# Patient Record
Sex: MALE | Race: WHITE | NOT HISPANIC OR LATINO | Employment: OTHER | URBAN - METROPOLITAN AREA
[De-identification: names, ages, dates, MRNs, and addresses within clinical notes are randomized per-mention and may not be internally consistent; named-entity substitution may affect disease eponyms.]

---

## 2017-03-04 ENCOUNTER — HOSPITAL ENCOUNTER (INPATIENT)
Facility: HOSPITAL | Age: 62
LOS: 1 days | Discharge: HOME | DRG: 303 | End: 2017-03-06
Attending: EMERGENCY MEDICINE | Admitting: HOSPITALIST
Payer: MEDICARE

## 2017-03-04 DIAGNOSIS — R29.810 FACIAL DROOP: ICD-10-CM

## 2017-03-04 DIAGNOSIS — I24.0 ACUTE THROMBUS OF LEFT VENTRICLE: Primary | ICD-10-CM

## 2017-03-04 DIAGNOSIS — G45.9 TRANSIENT CEREBRAL ISCHEMIA, UNSPECIFIED TYPE: ICD-10-CM

## 2017-03-04 DIAGNOSIS — I51.7 CARDIOMEGALY: ICD-10-CM

## 2017-03-04 LAB
ANION GAP SERPL CALC-SCNC: 14 MMOL/L
BASOPHILS # BLD AUTO: 0.1 K/UL
BASOPHILS NFR BLD: 0.4 %
BUN SERPL-MCNC: 29 MG/DL
CALCIUM SERPL-MCNC: 9.7 MG/DL
CHLORIDE SERPL-SCNC: 104 MMOL/L
CO2 SERPL-SCNC: 20 MMOL/L
CREAT SERPL-MCNC: 1.7 MG/DL
DIFFERENTIAL METHOD: ABNORMAL
EOSINOPHIL # BLD AUTO: 0.3 K/UL
EOSINOPHIL NFR BLD: 2.4 %
ERYTHROCYTE [DISTWIDTH] IN BLOOD BY AUTOMATED COUNT: 14.3 %
EST. GFR  (AFRICAN AMERICAN): 49 ML/MIN/1.73 M^2
EST. GFR  (NON AFRICAN AMERICAN): 42 ML/MIN/1.73 M^2
GLUCOSE SERPL-MCNC: 104 MG/DL
HCT VFR BLD AUTO: 53.2 %
HGB BLD-MCNC: 17.4 G/DL
LYMPHOCYTES # BLD AUTO: 2.9 K/UL
LYMPHOCYTES NFR BLD: 24.5 %
MCH RBC QN AUTO: 29.3 PG
MCHC RBC AUTO-ENTMCNC: 32.7 %
MCV RBC AUTO: 90 FL
MONOCYTES # BLD AUTO: 1 K/UL
MONOCYTES NFR BLD: 8.7 %
NEUTROPHILS # BLD AUTO: 7.5 K/UL
NEUTROPHILS NFR BLD: 64 %
PLATELET # BLD AUTO: 188 K/UL
PMV BLD AUTO: 8.9 FL
POTASSIUM SERPL-SCNC: 4.1 MMOL/L
RBC # BLD AUTO: 5.93 M/UL
SODIUM SERPL-SCNC: 138 MMOL/L
WBC # BLD AUTO: 11.8 K/UL

## 2017-03-04 PROCEDURE — 80048 BASIC METABOLIC PNL TOTAL CA: CPT

## 2017-03-04 PROCEDURE — G0378 HOSPITAL OBSERVATION PER HR: HCPCS

## 2017-03-04 PROCEDURE — 36415 COLL VENOUS BLD VENIPUNCTURE: CPT

## 2017-03-04 PROCEDURE — 25000003 PHARM REV CODE 250: Performed by: EMERGENCY MEDICINE

## 2017-03-04 PROCEDURE — 85025 COMPLETE CBC W/AUTO DIFF WBC: CPT

## 2017-03-04 PROCEDURE — 99285 EMERGENCY DEPT VISIT HI MDM: CPT

## 2017-03-04 PROCEDURE — 93005 ELECTROCARDIOGRAM TRACING: CPT

## 2017-03-04 RX ORDER — ENOXAPARIN SODIUM 100 MG/ML
40 INJECTION SUBCUTANEOUS EVERY 24 HOURS
Status: DISCONTINUED | OUTPATIENT
Start: 2017-03-05 | End: 2017-03-05

## 2017-03-04 RX ORDER — IBUPROFEN 200 MG
16 TABLET ORAL
Status: DISCONTINUED | OUTPATIENT
Start: 2017-03-04 | End: 2017-03-06 | Stop reason: HOSPADM

## 2017-03-04 RX ORDER — ATORVASTATIN CALCIUM 20 MG/1
20 TABLET, FILM COATED ORAL DAILY
Status: DISCONTINUED | OUTPATIENT
Start: 2017-03-05 | End: 2017-03-06

## 2017-03-04 RX ORDER — POTASSIUM CHLORIDE 20 MEQ/1
20 TABLET, EXTENDED RELEASE ORAL DAILY
Status: DISCONTINUED | OUTPATIENT
Start: 2017-03-05 | End: 2017-03-06 | Stop reason: HOSPADM

## 2017-03-04 RX ORDER — ATORVASTATIN CALCIUM 20 MG/1
20 TABLET, FILM COATED ORAL DAILY
Status: ON HOLD | COMMUNITY
End: 2017-03-06 | Stop reason: HOSPADM

## 2017-03-04 RX ORDER — METFORMIN HYDROCHLORIDE 1000 MG/1
1000 TABLET ORAL 2 TIMES DAILY WITH MEALS
Status: ON HOLD | COMMUNITY
End: 2017-03-22 | Stop reason: HOSPADM

## 2017-03-04 RX ORDER — IBUPROFEN 200 MG
24 TABLET ORAL
Status: DISCONTINUED | OUTPATIENT
Start: 2017-03-04 | End: 2017-03-06 | Stop reason: HOSPADM

## 2017-03-04 RX ORDER — CARVEDILOL 6.25 MG/1
12.5 TABLET ORAL 2 TIMES DAILY WITH MEALS
Status: DISCONTINUED | OUTPATIENT
Start: 2017-03-04 | End: 2017-03-06 | Stop reason: HOSPADM

## 2017-03-04 RX ORDER — ASPIRIN 325 MG
325 TABLET ORAL
Status: COMPLETED | OUTPATIENT
Start: 2017-03-04 | End: 2017-03-04

## 2017-03-04 RX ORDER — CARVEDILOL 25 MG/1
12.5 TABLET ORAL 2 TIMES DAILY WITH MEALS
COMMUNITY
End: 2017-03-24

## 2017-03-04 RX ORDER — INSULIN ASPART 100 [IU]/ML
0-5 INJECTION, SOLUTION INTRAVENOUS; SUBCUTANEOUS
Status: DISCONTINUED | OUTPATIENT
Start: 2017-03-04 | End: 2017-03-06 | Stop reason: HOSPADM

## 2017-03-04 RX ORDER — FUROSEMIDE 20 MG/1
60 TABLET ORAL 2 TIMES DAILY
Status: ON HOLD | COMMUNITY
End: 2017-03-05 | Stop reason: HOSPADM

## 2017-03-04 RX ORDER — METFORMIN HYDROCHLORIDE 500 MG/1
1000 TABLET ORAL 2 TIMES DAILY WITH MEALS
Status: DISCONTINUED | OUTPATIENT
Start: 2017-03-04 | End: 2017-03-04

## 2017-03-04 RX ORDER — GLUCAGON 1 MG
1 KIT INJECTION
Status: DISCONTINUED | OUTPATIENT
Start: 2017-03-04 | End: 2017-03-06 | Stop reason: HOSPADM

## 2017-03-04 RX ORDER — NAPROXEN SODIUM 220 MG/1
81 TABLET, FILM COATED ORAL DAILY
Status: DISCONTINUED | OUTPATIENT
Start: 2017-03-05 | End: 2017-03-05

## 2017-03-04 RX ORDER — POTASSIUM CHLORIDE 20 MEQ/1
20 TABLET, EXTENDED RELEASE ORAL DAILY
COMMUNITY

## 2017-03-04 RX ADMIN — ASPIRIN 325 MG ORAL TABLET 325 MG: 325 PILL ORAL at 08:03

## 2017-03-04 NOTE — IP AVS SNAPSHOT
[address] [place] [address]  Phone: [phone]           Patient Discharge Instructions     Our goal is to set you up for success. This packet includes information on your condition, medications, and your home care. It will help you to care for yourself so you don't get sicker and need to go back to the hospital.     Please ask your nurse if you have any questions.        There are many details to remember when preparing to leave the hospital. Here is what you will need to do:    1. Take your medicine. If you are prescribed medications, review your Medication List in the following pages. You may have new medications to  at the pharmacy and others that you'll need to stop taking. Review the instructions for how and when to take your medications. Talk with your doctor or nurses if you are unsure of what to do.     2. Go to your follow-up appointments. Specific follow-up information is listed in the following pages. Your may be contacted by a transition nurse or clinical provider about future appointments. Be sure we have all of the phone numbers to reach you, if needed. Please contact your provider's office if you are unable to make an appointment.     3. Watch for warning signs. Your doctor or nurse will give you detailed warning signs to watch for and when to call for assistance. These instructions may also include educational information about your condition. If you experience any of warning signs to your health, call your doctor.               [facility]  Unless otherwise directed by your provider, please contact [facility], our nurse care line that is available for 24/7 assistance.     [phone] (toll-free)    Registered nurses in the [facility] provide clinical advisement, health education, appointment booking, and other advisory services.                    ** Verify the list of medication(s) below is accurate and up to date.  Carry this with you in case of emergency. If your medications have changed, please notify your healthcare provider.             Medication List      START taking these medications        Additional Info                      apixaban 5 mg Tab   Quantity:  60 tablet   Refills:  0   Dose:  10 mg    Last time this was given:  10 mg on 3/6/2017 12:33 PM   Instructions:  Take 2 tablets (10 mg total) by mouth 2 (two) times daily. 10 mg twice daily x 10 days then 5 mg twice daily.     Begin Date    AM    Noon    PM    Bedtime       aspirin 81 MG EC tablet   Commonly known as:  ECOTRIN   Refills:  0   Dose:  81 mg    Last time this was given:  81 mg on 3/6/2017  8:03 AM   Instructions:  Take 1 tablet (81 mg total) by mouth once daily.     Begin Date    AM    Noon    PM    Bedtime       lisinopril 5 MG tablet   Commonly known as:  PRINIVIL,ZESTRIL   Quantity:  90 tablet   Refills:  1   Dose:  5 mg    Last time this was given:  5 mg on 3/6/2017 12:33 PM   Instructions:  Take 1 tablet (5 mg total) by mouth once daily.     Begin Date    AM    Noon    PM    Bedtime         CHANGE how you take these medications        Additional Info                      furosemide 40 MG tablet   Commonly known as:  LASIX   Quantity:  30 tablet   Refills:  0   Dose:  40 mg   What changed:    - medication strength  - how much to take  - when to take this  - reasons to take this    Last time this was given:  60 mg on 3/5/2017  9:12 AM   Instructions:  Take 1 tablet (40 mg total) by mouth daily as needed.     Begin Date    AM    Noon    PM    Bedtime         CONTINUE taking these medications        Additional Info                      atorvastatin 20 MG tablet   Commonly known as:  LIPITOR   Refills:  0   Dose:  20 mg    Last time this was given:  20 mg on 3/6/2017  8:03 AM   Instructions:  Take 20 mg by mouth once daily.     Begin Date    AM    Noon    PM    Bedtime       carvedilol 25 MG tablet   Commonly known as:  COREG   Refills:  0   Dose:   12.5 mg    Last time this was given:  12.5 mg on 3/6/2017  8:03 AM   Instructions:  Take 12.5 mg by mouth 2 (two) times daily with meals.     Begin Date    AM    Noon    PM    Bedtime       insulin NPH-insulin regular (70/30) 100 unit/mL (70-30) injection   Commonly known as:  NOVOLIN 70/30   Refills:  0   Dose:  30 Units    Instructions:  Inject 30 Units into the skin once daily.     Begin Date    AM    Noon    PM    Bedtime       metformin 1000 MG tablet   Commonly known as:  GLUCOPHAGE   Refills:  0   Dose:  1000 mg    Instructions:  Take 1,000 mg by mouth 2 (two) times daily with meals.     Begin Date    AM    Noon    PM    Bedtime       potassium chloride SA 20 MEQ tablet   Commonly known as:  K-DUR,KLOR-CON   Refills:  0   Dose:  20 mEq    Last time this was given:  20 mEq on 3/6/2017  8:03 AM   Instructions:  Take 20 mEq by mouth once daily.     Begin Date    AM    Noon    PM    Bedtime            Where to Get Your Medications      You can get these medications from any pharmacy     Bring a paper prescription for each of these medications     apixaban 5 mg Tab    furosemide 40 MG tablet    lisinopril 5 MG tablet       You don't need a prescription for these medications     aspirin 81 MG EC tablet                  Please bring to all follow up appointments:    1. A copy of your discharge instructions.  2. All medicines you are currently taking in their original bottles.  3. Identification and insurance card.    Please arrive 15 minutes ahead of scheduled appointment time.    Please call 24 hours in advance if you must reschedule your appointment and/or time.        Follow-up Information     [facility] In 1 week.    Why:  hospital follow up    Contact information:    [address] [facility] [address]  [phone]          Follow up with Arash Marin MD In 2 weeks.    Specialty:  Cardiology    Why:  hospital follow up    Contact information:    [address]  [place] [address]  [phone]          Discharge Instructions     Future Orders    Activity as tolerated     Activity as tolerated     Call MD for:  difficulty breathing or increased cough     Call MD for:  persistent dizziness, light-headedness, or visual disturbances     Call MD for:  persistent dizziness, light-headedness, or visual disturbances     Call MD for:  persistent nausea and vomiting or diarrhea     Diet general     Questions:    Total calories:      Fat restriction, if any:      Protein restriction, if any:      Na restriction, if any:      Fluid restriction:      Additional restrictions:  Cardiac (Low Na/Chol)    Diet general     Questions:    Total calories:      Fat restriction, if any:      Protein restriction, if any:      Na restriction, if any:      Fluid restriction:      Additional restrictions:  Cardiac (Low Na/Chol)        Discharge Instructions       Discharge instructions given and understanding noted.    Discharge References/Attachments     HEART FAILURE: MEDICINES TO HELP YOUR HEART (ENGLISH)    HEART FAILURE: TRACKING YOUR WEIGHT (ENGLISH)    HEART FAILURE: WARNING SIGNS OF A FLARE-UP (ENGLISH)    HEART FAILURE, WHAT IS (ENGLISH)    HEART FAILURE: MAKING CHANGES TO YOUR DIET (ENGLISH)    HEART FAILURE: BEING ACTIVE (ENGLISH)        Primary Diagnosis     Your primary diagnosis was:  Heart Attack      Admission Information     Date & Time Provider Department [facility]    3/4/2017  6:03 PM Colby Summers MD [facility] [id number]      Care Providers     Provider Role Specialty Primary office phone    Colby Summers MD Attending Provider Hospitalist [phone]    Arash Marin MD Consulting Physician  Cardiology [phone]    Anabelle Miller NP Consulting Provider  Cardiology  [phone]      Your Vitals Were     BP Pulse Temp Resp Height Weight    111/76 (BP Location: Right arm, Patient Position: Lying, BP Method: Automatic) 75 97.5 °F (36.4 °C) 18 6'  "2" (1.88 m) 113.4 kg (250 lb)    SpO2 BMI             97% 32.1 kg/m2         Recent Lab Values     No lab values to display.      Allergies as of 3/6/2017     No Known Allergies      Advance Directives     An advance directive is a document which, in the event you are no longer able to make decisions for yourself, tells your healthcare team what kind of treatment you do or do not want to receive, or who you would like to make those decisions for you.  If you do not currently have an advance directive, [facility] encourages you to create one.  For more information call:  [phone] ([phone]), [phone] ([phone]),  or log on to [email or web address].        Smoking Cessation     If you would like to quit smoking:   You may be eligible for free services if you are a [place] resident and started smoking cigarettes before September 1, 1988.  Call the Smoking Cessation [employer] ([facility]) toll free at ([phone] or ([phone] [phone].   Call 6-492-QUIT-NOW if you do not meet the above criteria.            Language Assistance Services     ATTENTION: Language assistance services are available, free of charge. Please call [phone].      ATENCIÓN: Si habla español, tiene a maya disposición servicios gratuitos de asistencia lingüística. Llame al [phone].     CHÚ Ý: N?u b?n nói Ti?ng Vi?t, có các d?ch v? h? tr? ngôn ng? mi?n phí dành cho b?n. G?i s? [phone].        Heart Failure Education       Heart Failure: Being Active  You have a condition called heart failure. Being active doesnt mean that you have to wear yourself out. Even a little movement each day helps to strengthen your heart. If you cant get out to exercise, you can do simple stretching and strengthening exercises at home. These are good ways to keep you well-conditioned and prevent you and your heart from becoming excessively weak.    Ideas to get you started  · Add a little movement to things you do now. Walk to mail letters. Park " your car at the far end of the parking lot and walk to the store. Walk up a flight of stairs instead of taking the elevator.  · Choose activities you enjoy. You might walk, swim, or ride an exercise bike. Things like gardening and washing the car count, too. Other possibilities include: washing dishes, walking the dog, walking around the mall, and doing aerobic activities with friends.  · Join a group exercise program at a [facility], a senior center, or a community center. Or look into a hospital cardiac rehabilitation program. Ask your doctor if you qualify.  Tips to keep you going  · Get up and get dressed each day. Go to a coffee shop and read a newspaper or go somewhere that you'll be in the presence of other active people. Youll feel more like being active.  · Make a plan. Choose one or more activities that you enjoy and that you can easily do. Then plan to do at least one each day. You might write your plan on a calendar.  · Go with a friend or a group if you like company. This can help you feel supported and stay motivated, too.  · Plan social events that you enjoy. This will keep you mentally engaged as well as physically motivated to do things you find pleasure in.  For your safety  · Talk with your healthcare provider before starting an exercise program.  · Exercise indoors when its too hot or too cold outside, or when the air quality is poor. Try walking at a shopping mall.  · Wear socks and sturdy shoes to maintain your balance and prevent falls.  · Start slowly. Do a few minutes several times a day at first. Increase your time and speed little by little.  · Stop and rest whenever you feel tired or get short of breath.  · Dont push yourself on days when you dont feel well.  Date Last Reviewed: 3/20/2016  © 7825-4655 [employer]. [address] [place] [address], [place] [address]. All rights reserved. This information is not intended as a substitute for professional medical care. Always follow  your healthcare professional's instructions.              Heart Failure: Evaluating Your Heart  You have a condition called heart failure. To evaluate your condition, your doctor will examine you, ask questions, and do some tests. Along with looking for signs of heart failure, the doctor looks for any other health problems that may have led to heart failure. The results of your evaluation will help your doctor form a treatment plan.  Health history and physical exam  Your visit will start with a health history. Tell the doctor about any symptoms youve noticed and about all medicines you take. Then youll have a physical exam. This includes listening to your heartbeat and breathing. Youll also be checked for swelling (edema) in your legs and neck. When you have fluid buildup or fluid in the lungs, it may be called congestive heart failure.  Diagnosing heart failure     During an echocardiogram, sound waves bounce off the heart. These are converted into a picture on the screen.   The following may be done to help your doctor form a diagnosis:  · X-rays show the size and shape of your heart. These pictures can also show fluid in your lungs.  · An electrocardiogram (ECG or EKG) shows the pattern of your heartbeat. Small pads (electrodes) are placed on your chest, arms, and legs. Wires connect the pads to the ECG machine, which records your hearts electrical signals. This can give the doctor information about heart function.  · An echocardiogram uses ultrasound waves to show the structure and movement of your heart muscle. This shows how well the heart pumps. It also shows the thickness of the heart walls, and if the heart is enlarged. It is one of the most useful, non-invasive tests as it provides information about the heart's general function. This helps your doctor make treatment decisions.  · Lab tests evaluate small amounts of blood or urine for signs of problems. A BNP lab test can help diagnose and evaluate  heart failure. BNP stands for B-type natriuretic peptide. The ventricles secrete more BNP when heart failure worsens. Lab tests can also provide information about metabolic dysfunction or heart dysfunction.  Your treatment plan  Based on the results of your evaluation and tests, your doctor will develop a treatment plan. This plan is designed to relieve some of your heart failure symptoms and help make you more comfortable. Your treatment plan may include:  · Medicine to help your heart work better and improve your quality of life  · Changes in what you eat and drink to help prevent fluid from backing up in your body  · Daily monitoring of your weight and heart failure symptoms to see how well your treatment plan is working  · Exercise to help you stay healthy  · Help with quitting smoking  · Emotional and psychological support to help adjust to the changes  · Referrals to other specialists to make sure you are being treated comprehensively  Date Last Reviewed: 3/21/2016  © 1360-1552 [employer]. [address] [place] [address]. All rights reserved. This information is not intended as a substitute for professional medical care. Always follow your healthcare professional's instructions.              Heart Failure: Making Changes to Your Diet  You have a condition called heart failure. When you have heart failure, excess fluid is more likely to build up in your body because your heart isn't working well. This makes the heart work harder to pump blood. Fluid buildup causes symptoms such as shortness of breath and swelling (edema). This is often referred to as congestive heart failure or CHF. Controlling the amount of salt (sodium) you eat may help stop fluid from building up. Your doctor may also tell you to reduce the amount of fluid you drink.  Reading food labels    Your healthcare provider will tell you how much sodium you can eat each day. Read food labels to keep track. Keep in mind that  certain foods are high in salt. These include canned, frozen, and processed foods. Check the amount of sodium in each serving. Watch out for high-sodium ingredients. These include MSG (monosodium glutamate), baking soda, and sodium phosphate.   Eating less salt  Give yourself time to get used to eating less salt. It may take a little while. Here are some tips to help:  · Take the saltshaker off the table. Replace it with salt-free herb mixes and spices.  · Eat fresh or plain frozen vegetables. These have much less salt than canned vegetables.  · Choose low-sodium snacks like sodium-free pretzels, crackers, or air-popped popcorn.  · Dont add salt to your food when youre cooking. Instead, season your foods with pepper, lemon, garlic, or onion.  · When you eat out, ask that your food be cooked without added salt.  · Avoid eating fried foods as these often have a great deal of salt.  If youre told to limit fluids  You may need to limit how much fluid you have to help prevent swelling. This includes anything that is liquid at room temperature, such as ice cream and soup. If your doctor tells you to limit fluid, try these tips:  · Measure drinks in a measuring cup before you drink them. This will help you meet daily goals.  · Chill drinks to make them more refreshing.  · Suck on frozen lemon wedges to quench thirst.  · Only drink when youre thirsty.  · Chew sugarless gum or suck on hard candy to keep your mouth moist.  · Weigh yourself daily to know if your body's fluid content is rising.  My sodium goal  Your healthcare provider may give you a sodium goal to meet each day. This includes sodium found in food as well as salt that you add. My goal is to eat no more than ___________ mg of sodium per day.     When to call your doctor  Call your doctor right away if you have any symptoms of worsening heart failure. These can include:  · Sudden weight gain  · Increased swelling of your legs or ankles  · Trouble breathing  when youre resting or at night  · Increase in the number of pillows you have to sleep on  · Chest pain, pressure, discomfort, or pain in the jaw, neck, or back   Date Last Reviewed: 3/21/2016  © 8973-4646 [employer]. [address] [place] [address]. All rights reserved. This information is not intended as a substitute for professional medical care. Always follow your healthcare professional's instructions.              Heart Failure: Medicines to Help Your Heart    You have a condition called heart failure (also known as congestive heart failure, or CHF). Your doctor will likely prescribe medicines for heart failure and any underlying health problems you have. Most heart failure patients take one or more types of medicinen. Your healthcare provider will work to find the combination of medicines that works best for you.  Heart failure medicines  Here are the most common heart failure medicines:  · ACE inhibitors lower blood pressure and decrease strain on the heart. This makes it easier for the heart to pump. Angiotensin receptor blockers have similar effects. These are prescribed for some patients instead of ACE inhibitors.  · Beta-blockers relieve stress on the heart. They also improve symptoms. They may also improve the heart's pumping action over time.  · Diuretics (also called water pills) help rid your body of excess water. This can help rid your body of swelling (edema). Having less fluid to pump means your heart doesnt have to work as hard. Some diuretics make your body lose a mineral called potassium. Your doctor will tell you if you need to take supplements or eat more foods high in potassium.  · Digoxin helps your heart pump with more strength. This helps your heart pump more blood with each beat. So, more oxygen-rich blood travels to the rest of the body.  · Aldosterone antagonists help alter hormones and decrease strain on the heart.  · Hydralazine and nitrates are two  separate medicines used together to treat heart failure. They may come in one combination pill. They lower blood pressure and decrease how hard the heart has to pump.  Medicines for related conditions  Controlling other heart problems helps keep heart failure under control, too. Depending on other heart problems you have, medicines may be prescribed to:  · Lower blood pressure (antihypertensives).  · Lower cholesterol levels (statins).  · Prevent blood clots (anticoagulants or aspirin).  · Keep the heartbeat steady (antiarrhythmics).  Date Last Reviewed: 3/5/2016  © 4820-3556 [employer]. [address] [place] [address]. All rights reserved. This information is not intended as a substitute for professional medical care. Always follow your healthcare professional's instructions.              Heart Failure: Procedures That May Help    The heart is a muscle that pumps oxygen-rich blood to all parts of the body. When you have heart failure, the heart is not able to pump as well as it should. Blood and fluid may back up into the lungs (congestive heart failure), and some parts of the body dont get enough oxygen-rich blood to work normally. These problems lead to the symptoms of heart failure.     Certain procedures may help the heart pump better in some cases of heart failure. Some procedures are done to treat health problems that may have caused the heart failure such as coronary artery disease or heart rhythm problems. For more serious heart failure, other options are available.  Treating artery and valve problems  If you have coronary artery disease or valve disease, procedures may be done to improve blood flow. This helps the heart pump better, which can improve heart failure symptoms. First, your doctor may do a cardiac catheterization to help detect clogged blood vessels or valve damage. During this procedure, a  thin tube (catheter) in inserted into a blood vessel and guided to the  heart. There a dye is injected and a special type of X-ray (angiogram) is taken of the blood vessels. Procedures to open a blocked artery or fix damaged valves can also be done using catheterization.  · Angioplasty uses a balloon-tipped instrument at the end of the catheter. The balloon is inflated to widen the narrowed artery. In many cases, a stent is expanded to further support the narrowed artery. A stent is a metal mesh tube.  · Valve surgery repairs or replacement of faulty valves can also be done during catheterization so blood can flow properly through the chambers of the heart.  Bypass surgery is another option to help treat blocked arteries. It uses a healthy blood vessel from elsewhere in the body. The healthy blood vessel is attached above and below the blocked area so that blood can flow around the blocked artery.  Treating heart rhythm problems  A device may be placed in the chest to help a weak heart maintain a healthy, heartbeat so the heart can pump more effectively:  · Pacemaker. A pacemaker is an implanted device that regulates your heartbeat electronically. It monitors your heart's rhythm and generates a painless electric impulse that helps the heart beat in a regular rhythm. A pacemaker is programmed to meet your specific heart rhythm needs.  · Biventricular pacing/cardiac resynchronization therapy. A type of pacemaker that paces both pumping chambers of the heart at the same time to coordinate contractions and to improve the heart's function. Some people with heart failure are candidates for this therapy.  · Implantable cardioverter defibrillator. A device similar to a pacemaker that senses when the heart is beating too fast and delivers an electrical shock to convert the fast rhythm to a normal rhythm. This can be a life saving device.  In severe cases  In more serious cases of heart failure when other treatments no longer work, other options may include:  · Ventricular assist devices (VADs).  These are mechanical devices used to take over the pumping function for one or both of the heart's ventricles, or pumping chambers. A VAD may be necessary when heart failure progresses to the point that medicines and other treatments no longer help. In some cases, a VAD may be used as a bridge to transplant.  · Heart transplant. This is replacing the diseased heart with a healthy one from a donor. This is an option for a few people who are very sick. A heart transplant is very serious and not an option for all patients. Your doctor can tell you more.  Date Last Reviewed: 3/20/2016  © 0517-7103 [employer]. [address] [place] [address], [place] [address]. All rights reserved. This information is not intended as a substitute for professional medical care. Always follow your healthcare professional's instructions.              Heart Failure: Tracking Your Weight  You have a condition called heart failure. When you have heart failure, a sudden weight gain or a steady rise in weight is a warning sign that your body is retaining too much water and salt. This could mean your heart failure is getting worse. If left untreated, it can cause problems for your lungs and result in shortness of breath. Weighing yourself each day is the best way to know if youre retaining water. If your weight goes up quickly, call your doctor. You will be given instructions on how to get rid of the excess water. You will likely need medicines and to avoid salt. This will help your heart work better.  Call your doctor if you gain more than 2 pounds in 1 day, more than 5 pounds in 1 week, or whatever weight gain you were told to report by your doctor. This is often a sign of worsening heart failure and needs to be evaluated and treated. Your doctor will tell you what to do next.   Tips for weighing yourself    · Weigh yourself at the same time each morning, wearing the same clothes. Weigh yourself after urinating and before eating.  · Use  the same scale each day. Make sure the numbers are easy to read. Put the scale on a flat, hard surface -- not on a rug or carpet.  · Do not stop weighing yourself. If you forget one day, weigh again the next morning.  How to use your weight chart  · Keep your weight chart near the scale. Write your weight on the chart as soon as you get off the scale.  · Fill in the month and the start date on the chart. Then write down your weight each day. Your chart will look like this:    · If you miss a day, leave the space blank. Weigh yourself the next day and write your weight in the next space.  · Take your weight chart with you when you go to see your doctor.  Date Last Reviewed: 3/20/2016  © 9183-6239 [employer]. [address] [place] [address], [place] [address]. All rights reserved. This information is not intended as a substitute for professional medical care. Always follow your healthcare professional's instructions.              Heart Failure: Warning Signs of a Flare-Up  You have a condition called heart failure. Once you have heart failure, flare-ups can happen. Below are signs that can mean your heart failure is getting worse. If you notice any of these warning signs, call your healthcare provider.  Swelling    · Your feet, ankles, or lower legs get puffier.  · You notice skin changes on your lower legs.  · Your shoes feel too tight.  · Your clothes are tighter in the waist.  · You have trouble getting rings on or off your fingers.  Shortness of breath  · You have to breathe harder even when youre doing your normal activities or when youre resting.  · You are short of breath walking up stairs or even short distances.  · You wake up at night short of breath or coughing.  · You need to use more pillows or sit up to sleep.  · You wake up tired or restless.  Other warning signs  · You feel weaker, dizzy, or more tired.  · You have chest pain or changes in your heartbeat.  · You have a cough that wont go  away.  · You cant remember things or dont feel like eating.  Tracking your weight  Gaining weight is often the first warning sign that heart failure is getting worse. Gaining even a few pounds can be a sign that your body is retaining excess water and salt. Weighing yourself each day in the morning after you urinate and before you eat, is the best way to know if you're retaining water. Get a scale that is easy to read and make sure you wear the same clothes and use the same scale every time you weigh. Your healthcare provider will show you how to track your weight. Call your doctor if you gain more than 2 pounds in 1 day, 5 pounds in 1 week, or whatever weight gain you were told to report by your doctor. This is often a sign of worsening heart failure and needs to be evaluated and treated before it compromises your breathing. Your doctor will tell you what to do next.    Date Last Reviewed: 3/15/2016  © 6660-5787 [employer]. [address] [place] [address], [place] [address]. All rights reserved. This information is not intended as a substitute for professional medical care. Always follow your healthcare professional's instructions.              Diabetes Discharge Instructions                                   Eliquis Informaiton           [facility] Sign-Up     Activating your [facility] account is as easy as 1-2-3!     1) Visit [email or web address], select Sign Up Now, enter this activation code and your date of birth, then select Next.  [email or web address]  Expires: 4/19/2017  1:37 PM      2) Create a username and password to use when you visit [facility] in the future and select a security question in case you lose your password and select Next.    3) Enter your e-mail address and click Sign Up!    Additional Information  If you have questions, please e-mail [email or web address] or call [phone] to talk to our [facility] staff. Remember, [facility] is NOT to be used for urgent needs. For medical emergencies,  dial 911.          [facility] complies with applicable Federal civil rights laws and does not discriminate on the basis of race, color, national origin, age, disability, or sex.

## 2017-03-05 PROBLEM — I24.0 ACUTE THROMBUS OF LEFT VENTRICLE: Status: ACTIVE | Noted: 2017-03-05

## 2017-03-05 PROBLEM — F17.210 CIGARETTE SMOKER: Status: ACTIVE | Noted: 2017-03-05

## 2017-03-05 PROBLEM — F17.200 MODERATE TOBACCO DEPENDENCE: Status: ACTIVE | Noted: 2017-03-05

## 2017-03-05 PROBLEM — N17.9 AKI (ACUTE KIDNEY INJURY): Status: ACTIVE | Noted: 2017-03-05

## 2017-03-05 PROBLEM — G45.9 TIA (TRANSIENT ISCHEMIC ATTACK): Status: ACTIVE | Noted: 2017-03-04

## 2017-03-05 PROBLEM — E11.8 TYPE 2 DIABETES MELLITUS WITH COMPLICATION: Status: ACTIVE | Noted: 2017-03-05

## 2017-03-05 PROBLEM — I50.32 CHRONIC DIASTOLIC CHF (CONGESTIVE HEART FAILURE), NYHA CLASS 2: Status: ACTIVE | Noted: 2017-03-05

## 2017-03-05 LAB
BILIRUB UR QL STRIP: NEGATIVE
CHOLEST/HDLC SERPL: 4.4 {RATIO}
CLARITY UR: CLEAR
COLOR UR: YELLOW
CREAT SERPL-MCNC: 1.5 MG/DL
CREAT UR-MCNC: 195.3 MG/DL
EST. GFR  (AFRICAN AMERICAN): 56 ML/MIN/1.73 M^2
EST. GFR  (NON AFRICAN AMERICAN): 49 ML/MIN/1.73 M^2
GLUCOSE UR QL STRIP: NEGATIVE
HDL/CHOLESTEROL RATIO: 22.6 %
HDLC SERPL-MCNC: 133 MG/DL
HDLC SERPL-MCNC: 30 MG/DL
HGB UR QL STRIP: ABNORMAL
KETONES UR QL STRIP: NEGATIVE
LDLC SERPL CALC-MCNC: 76.6 MG/DL
LEUKOCYTE ESTERASE UR QL STRIP: NEGATIVE
NITRITE UR QL STRIP: NEGATIVE
NONHDLC SERPL-MCNC: 103 MG/DL
PH UR STRIP: 5 [PH] (ref 5–8)
POCT GLUCOSE: 109 MG/DL (ref 70–110)
POCT GLUCOSE: 154 MG/DL (ref 70–110)
POCT GLUCOSE: 162 MG/DL (ref 70–110)
POCT GLUCOSE: 173 MG/DL (ref 70–110)
PROT UR QL STRIP: NEGATIVE
SODIUM UR-SCNC: 34 MMOL/L
SP GR UR STRIP: >=1.03 (ref 1–1.03)
TRIGL SERPL-MCNC: 132 MG/DL
URATE UR-MCNC: 65.6 MG/DL
URN SPEC COLLECT METH UR: ABNORMAL
UROBILINOGEN UR STRIP-ACNC: NEGATIVE EU/DL

## 2017-03-05 PROCEDURE — 25000003 PHARM REV CODE 250: Performed by: EMERGENCY MEDICINE

## 2017-03-05 PROCEDURE — 36415 COLL VENOUS BLD VENIPUNCTURE: CPT

## 2017-03-05 PROCEDURE — 84300 ASSAY OF URINE SODIUM: CPT

## 2017-03-05 PROCEDURE — 82570 ASSAY OF URINE CREATININE: CPT

## 2017-03-05 PROCEDURE — 63600175 PHARM REV CODE 636 W HCPCS: Performed by: PHYSICIAN ASSISTANT

## 2017-03-05 PROCEDURE — 63600175 PHARM REV CODE 636 W HCPCS: Performed by: NURSE PRACTITIONER

## 2017-03-05 PROCEDURE — 81003 URINALYSIS AUTO W/O SCOPE: CPT

## 2017-03-05 PROCEDURE — 25000003 PHARM REV CODE 250: Performed by: PHYSICIAN ASSISTANT

## 2017-03-05 PROCEDURE — 84560 ASSAY OF URINE/URIC ACID: CPT

## 2017-03-05 PROCEDURE — 80061 LIPID PANEL: CPT

## 2017-03-05 PROCEDURE — 93306 TTE W/DOPPLER COMPLETE: CPT | Mod: 26,,, | Performed by: INTERNAL MEDICINE

## 2017-03-05 PROCEDURE — G0378 HOSPITAL OBSERVATION PER HR: HCPCS

## 2017-03-05 PROCEDURE — C8929 TTE W OR WO FOL WCON,DOPPLER: HCPCS

## 2017-03-05 PROCEDURE — 82565 ASSAY OF CREATININE: CPT

## 2017-03-05 PROCEDURE — 63600175 PHARM REV CODE 636 W HCPCS: Performed by: INTERNAL MEDICINE

## 2017-03-05 RX ORDER — ENOXAPARIN SODIUM 100 MG/ML
70 INJECTION SUBCUTANEOUS ONCE
Status: COMPLETED | OUTPATIENT
Start: 2017-03-05 | End: 2017-03-05

## 2017-03-05 RX ORDER — SODIUM CHLORIDE 9 MG/ML
INJECTION, SOLUTION INTRAVENOUS CONTINUOUS
Status: DISCONTINUED | OUTPATIENT
Start: 2017-03-05 | End: 2017-03-05

## 2017-03-05 RX ORDER — ASPIRIN 81 MG/1
81 TABLET ORAL DAILY
Status: DISCONTINUED | OUTPATIENT
Start: 2017-03-05 | End: 2017-03-06 | Stop reason: HOSPADM

## 2017-03-05 RX ORDER — ASPIRIN 81 MG/1
81 TABLET ORAL DAILY
Refills: 0 | Status: ON HOLD | COMMUNITY
Start: 2017-03-05 | End: 2017-03-14 | Stop reason: HOSPADM

## 2017-03-05 RX ORDER — ENOXAPARIN SODIUM 150 MG/ML
1 INJECTION SUBCUTANEOUS
Status: DISCONTINUED | OUTPATIENT
Start: 2017-03-06 | End: 2017-03-06

## 2017-03-05 RX ORDER — ASPIRIN 325 MG
325 TABLET ORAL DAILY
Status: DISCONTINUED | OUTPATIENT
Start: 2017-03-05 | End: 2017-03-05

## 2017-03-05 RX ADMIN — HUMAN ALBUMIN MICROSPHERES AND PERFLUTREN 0.11 MG: 10; .22 INJECTION, SOLUTION INTRAVENOUS at 01:03

## 2017-03-05 RX ADMIN — POTASSIUM CHLORIDE 20 MEQ: 20 TABLET, EXTENDED RELEASE ORAL at 09:03

## 2017-03-05 RX ADMIN — CARVEDILOL 12.5 MG: 6.25 TABLET, FILM COATED ORAL at 05:03

## 2017-03-05 RX ADMIN — ENOXAPARIN SODIUM 70 MG: 100 INJECTION SUBCUTANEOUS at 03:03

## 2017-03-05 RX ADMIN — ASPIRIN 81 MG: 81 TABLET, COATED ORAL at 09:03

## 2017-03-05 RX ADMIN — CARVEDILOL 12.5 MG: 6.25 TABLET, FILM COATED ORAL at 09:03

## 2017-03-05 RX ADMIN — ATORVASTATIN CALCIUM 20 MG: 20 TABLET, FILM COATED ORAL at 09:03

## 2017-03-05 RX ADMIN — ENOXAPARIN SODIUM 40 MG: 100 INJECTION SUBCUTANEOUS at 12:03

## 2017-03-05 RX ADMIN — FUROSEMIDE 60 MG: 20 TABLET ORAL at 09:03

## 2017-03-05 NOTE — UM SECONDARY REVIEW
Physician Advisor External    Level of Care Issue    Per Dr. Copeland at [facility], pt is OP appropriate for admit 3/4/2017

## 2017-03-05 NOTE — PROGRESS NOTES
[facility] Hospital Medicine  Progress Note    Patient Name: Earnest Wallis  MRN: [id number]  Patient Class: OP- Observation   Admission Date: 3/4/2017  Length of Stay: 0 days  Attending Physician: Babs Mcknight MD  Primary Care Provider: [facility]        Subjective:     Principal Problem:TIA (transient ischemic attack)    HPI:  Mr. Wallis presents for evaluation of RIGHT facial droop, numbness and slurred speech which lasted for 30 minutes. He has not experienced these symptoms in the past. He reports history of cerebral artery aneurysm. He denies history of CVA. He denies trauma. He does not check his BP at home but states it has been well-controlled according to his cardiologist. He states his blood glucose was recently elevated but his DM is normally well-controlled. He smokes a pack of cigarettes daily. He has been smoking for 50 + years. His father  in his 40's from MI. The patient has personal history of CAD. He denies focal weakness or numbness. He denies confusion.    Hospital Course:       Interval History: No new issues overnight. Facial droop resolved.  Awaiting echo and carotid US.     Review of Systems   Respiratory: Negative for cough, shortness of breath and wheezing.    Cardiovascular: Negative for chest pain, palpitations and leg swelling.   Gastrointestinal: Negative for abdominal pain and nausea.   Neurological: Negative for dizziness, syncope, speech difficulty and light-headedness. Facial asymmetry: right facial droop.   Hematological: Negative for adenopathy.   Psychiatric/Behavioral: Negative for agitation and confusion. The patient is not nervous/anxious.      Objective:     Vital Signs (Most Recent):  Temp: 97.6 °F (36.4 °C) (17 1208)  Pulse: 62 (17 1208)  Resp: 16 (17 1208)  BP: 115/73 (17 1208)  SpO2: (!) 94 % (17 1208) Vital Signs (24h Range):  Temp:  [96.8 °F (36 °C)-98.9 °F (37.2 °C)] 97.6 °F  (36.4 °C)  Pulse:  [60-76] 62  Resp:  [16-18] 16  SpO2:  [93 %-97 %] 94 %  BP: (109-133)/(73-81) 115/73     Weight: 113.4 kg (250 lb)  Body mass index is 32.1 kg/(m^2).    Intake/Output Summary (Last 24 hours) at 03/05/17 1432  Last data filed at 03/05/17 0043   Gross per 24 hour   Intake                0 ml   Output              200 ml   Net             -200 ml      Physical Exam   Constitutional: He is oriented to person, place, and time. He appears well-developed and well-nourished.   HENT:   Head: Normocephalic and atraumatic.   Mouth/Throat: Oropharynx is clear and moist.   Poor oral dentition    Eyes: Conjunctivae and EOM are normal. Pupils are equal, round, and reactive to light.   Neck: Normal range of motion. Neck supple. No thyromegaly present.   Cardiovascular: Normal rate, regular rhythm, normal heart sounds and intact distal pulses.    No murmur heard.  Pulmonary/Chest: Breath sounds normal. He is in respiratory distress. He has no wheezes.   Abdominal: Soft. Bowel sounds are normal. He exhibits no distension.   Musculoskeletal: Normal range of motion.   Neurological: He is alert and oriented to person, place, and time. No cranial nerve deficit.   Skin: Skin is warm and dry.   Psychiatric: He has a normal mood and affect. His behavior is normal.   Nursing note and vitals reviewed.      Significant Labs:   CBC:   Recent Labs  Lab 03/04/17  1824   WBC 11.80   HGB 17.4   HCT 53.2        CMP:   Recent Labs  Lab 03/04/17  1824 03/05/17  0505     --    K 4.1  --      --    CO2 20*  --      --    BUN 29*  --    CREATININE 1.7* 1.5*   CALCIUM 9.7  --    ANIONGAP 14  --    EGFRNONAA 42* 49*       Significant Imaging: I have reviewed and interpreted all pertinent imaging results/findings within the past 24 hours.    Assessment/Plan:      * TIA (transient ischemic attack)  Serial neurologic exams  Carotid Doppler to rule out stenosis  Echocardiogram to rule out thrombus  Consider increase  Aspirin to 325 mg but has ?YAO vs CKD  Discussed importance of smoking cessation  Glycemic and BP control  Consider CTA head to rule out vascular abnormality but has ?YAO vs CKD.     Echo with suspicion for RV thrombus per cardiology.       Chronic diastolic CHF (congestive heart failure), NYHA class 2  Continue Carvedilol, Furosemide      Type 2 diabetes mellitus with complication  Check BG prior to meals and QHS. Administer rapid-acting insulin according to low-dose sliding scale      Moderate tobacco dependence  Discussed smoking cessation      YAO (acute kidney injury)  Obtain urine for routine UA  Avoid non-essential nephrotoxins, dose medications according to GFR  Monitor I/O, daily weight. Monitor for volume overload  Consider renal bladder US to rule out obstructive nephropathy  Likely due to dehydration. Patient refusing IVF. Encouraged oral hydration         Acute thrombus of left ventricle  Initiate Lovenox 1mg/kg bid. Consult cardiology.  Check troponin.       VTE Risk Mitigation         Ordered     Medium Risk of VTE  Once      03/04/17 2244        Discussed with Cardiology tech. Patient with suspicion for LV thrombus per cardiology.   Discussed POC with patient. States understanding.     Amanda Warner NP  Department of Hospital Medicine   [facility]

## 2017-03-05 NOTE — ASSESSMENT & PLAN NOTE
Check BG prior to meals and QHS. Administer rapid-acting insulin according to low-dose sliding scale

## 2017-03-05 NOTE — ED PROVIDER NOTES
Encounter Date: 3/4/2017    SCRIBE #1 NOTE: I, Sugey Arguello, am scribing for, and in the presence of, Dr. Hicks.       History     Chief Complaint   Patient presents with    Facial Droop     R facial droop x 1 hr.     Review of patient's allergies indicates:  No Known Allergies  HPI Comments: 03/04/2017  6:08 PM     Chief Complaint: Right lower facial numbness and facial droop      The patient is a 61 y.o. male with a PMHx of DM and HTN who is presenting with a sudden onset of acute right lower facial numbness and facial droop that started about 1 hr ago at 5pm. Associated symptom of speech difficultly described as a garbled mumbling due to facial numbness and droop. Pt reported that his symptoms only involved his mouth and denied any weakness to his upper face. He stated that he napped for 10 minutes and then walked to get his tv dinner at 5pm when he felt water on the right side of his mouth. Then he looked in the mirror and saw his mouth drooping down on the right. His symptoms resolved PTA to ED. Pt reported driving 18 hours straight through from [place] and moving into a new apartment today. No neck pain. No headache or weakness to arms or legs. Pt reported a hx of a EF of 20-25% and a 3 lead defibrillator and pacemaker. Pt has no past surgical history on file.      The history is provided by the patient.     Past Medical History:   Diagnosis Date    Diabetes mellitus     Hypertension     MI, old 2014     Past Surgical History:   Procedure Laterality Date    CARDIAC PACEMAKER PLACEMENT  2014     History reviewed. No pertinent family history.  Social History   Substance Use Topics    Smoking status: Current Every Day Smoker     Packs/day: 0.50     Types: Cigarettes    Smokeless tobacco: None    Alcohol use No     Review of Systems   Constitutional: Negative for fever.   HENT: Negative for sore throat.    Eyes: Negative for visual disturbance.   Respiratory: Negative for shortness of breath.     Cardiovascular: Negative for chest pain.   Gastrointestinal: Negative for nausea.   Genitourinary: Negative for dysuria.   Musculoskeletal: Negative for back pain and neck pain.   Skin: Negative for rash.   Neurological: Positive for facial asymmetry (Right lower facial droop, resolved.), speech difficulty (Garbled mumbling due to facial numbness and droop, resolved.) and numbness (Right lower facial numbness, resolved.). Negative for weakness (No weakness to arms or legs.) and headaches.   Hematological: Does not bruise/bleed easily.   Psychiatric/Behavioral: Negative for confusion.       Physical Exam   Initial Vitals   BP Pulse Resp Temp SpO2   03/04/17 1757 03/04/17 1757 03/04/17 1757 03/04/17 1757 03/04/17 1757   127/77 76 16 98.9 °F (37.2 °C) 96 %     Physical Exam    Nursing note and vitals reviewed.  Constitutional: He appears well-developed and well-nourished.   HENT:   Head: Normocephalic and atraumatic.   Mouth/Throat: Oropharynx is clear and moist.   Eyes: Conjunctivae and EOM are normal. Pupils are equal, round, and reactive to light.   Neck: Neck supple.   Cardiovascular: Normal rate, regular rhythm, normal heart sounds and intact distal pulses. Exam reveals no gallop and no friction rub.    No murmur heard.  Pulmonary/Chest: Breath sounds normal. He has no wheezes. He has no rhonchi. He has no rales.   Abdominal: Soft. He exhibits no distension. There is no tenderness.   Musculoskeletal: Normal range of motion.   Neurological: He is alert and oriented to person, place, and time. Gait normal.   No facial droop, upper or lower.  Cranial nerves 3-12 intact.  5/5 strength and sensation.   Skin: No rash noted. No erythema.   Psychiatric: He has a normal mood and affect.         ED Course   Procedures  Labs Reviewed   CBC W/ AUTO DIFFERENTIAL - Abnormal; Notable for the following:        Result Value    MPV 8.9 (*)     All other components within normal limits   BASIC METABOLIC PANEL - Abnormal; Notable  for the following:     CO2 20 (*)     BUN, Bld 29 (*)     Creatinine 1.7 (*)     eGFR if  49 (*)     eGFR if non  42 (*)     All other components within normal limits                        Scribe Attestation:   Scribe #1: I performed the above scribed service and the documentation accurately describes the services I performed. I attest to the accuracy of the note.    Attending Attestation:           Physician Attestation for Scribe:  Physician Attestation Statement for Scribe #1: I, Dr. Hicks, reviewed documentation, as scribed by Sugey Arguello in my presence, and it is both accurate and complete.         Earnest Wallis is a 61 y.o. male presenting with transient right lower facial droop suspicious for TIA.  I doubt CVA given lack of ongoing subjective or objective signs or symptoms.  I will admit for further observation.  CT head shows no sign of intracranial hemorrhage.  Further history obtained from [facility] states patient's pacer is not MRI compatible.  Further CTA at neurology discretion.  I do not think emergent neurology consultation in ED or TPA are indicated in this patient.  NIH stroke scale is currently 0.  I have spoken with hospitalist Dr. Mcknight who will assume care.  Patient will be admitted to a monitored bed.        ED Course   Comment By Time   EKG:  Electronic ventricular pacer, rate of 68, wide QRS.  No sign of other arrhythmia or acute ischemia. Alvaro Hicks MD 03/04 1901   CT-H:  NAD. (radiology reading, visualized by me) Alvaro iHcks MD 03/04 1928     Clinical Impression:   The primary encounter diagnosis was Facial droop. A diagnosis of Transient cerebral ischemia, unspecified type was also pertinent to this visit.          Alvaro Hicks MD  03/04/17 2055

## 2017-03-05 NOTE — ASSESSMENT & PLAN NOTE
Obtain urine for routine UA  Avoid non-essential nephrotoxins, dose medications according to GFR  Monitor I/O, daily weight. Monitor for volume overload  Consider renal bladder US to rule out obstructive nephropathy  Likely due to dehydration. Patient refusing IVF. Encouraged oral hydration

## 2017-03-05 NOTE — SUBJECTIVE & OBJECTIVE
Interval History: No new issues overnight. Facial droop resolved.  Awaiting echo and carotid US.     Review of Systems   Respiratory: Negative for cough, shortness of breath and wheezing.    Cardiovascular: Negative for chest pain, palpitations and leg swelling.   Gastrointestinal: Negative for abdominal pain and nausea.   Neurological: Negative for dizziness, syncope, speech difficulty and light-headedness. Facial asymmetry: right facial droop.   Hematological: Negative for adenopathy.   Psychiatric/Behavioral: Negative for agitation and confusion. The patient is not nervous/anxious.      Objective:     Vital Signs (Most Recent):  Temp: 97.6 °F (36.4 °C) (03/05/17 1208)  Pulse: 62 (03/05/17 1208)  Resp: 16 (03/05/17 1208)  BP: 115/73 (03/05/17 1208)  SpO2: (!) 94 % (03/05/17 1208) Vital Signs (24h Range):  Temp:  [96.8 °F (36 °C)-98.9 °F (37.2 °C)] 97.6 °F (36.4 °C)  Pulse:  [60-76] 62  Resp:  [16-18] 16  SpO2:  [93 %-97 %] 94 %  BP: (109-133)/(73-81) 115/73     Weight: 113.4 kg (250 lb)  Body mass index is 32.1 kg/(m^2).    Intake/Output Summary (Last 24 hours) at 03/05/17 1432  Last data filed at 03/05/17 0043   Gross per 24 hour   Intake                0 ml   Output              200 ml   Net             -200 ml      Physical Exam   Constitutional: He is oriented to person, place, and time. He appears well-developed and well-nourished.   HENT:   Head: Normocephalic and atraumatic.   Mouth/Throat: Oropharynx is clear and moist.   Poor oral dentition    Eyes: Conjunctivae and EOM are normal. Pupils are equal, round, and reactive to light.   Neck: Normal range of motion. Neck supple. No thyromegaly present.   Cardiovascular: Normal rate, regular rhythm, normal heart sounds and intact distal pulses.    No murmur heard.  Pulmonary/Chest: Breath sounds normal. He is in respiratory distress. He has no wheezes.   Abdominal: Soft. Bowel sounds are normal. He exhibits no distension.   Musculoskeletal: Normal range of  motion.   Neurological: He is alert and oriented to person, place, and time. No cranial nerve deficit.   Skin: Skin is warm and dry.   Psychiatric: He has a normal mood and affect. His behavior is normal.   Nursing note and vitals reviewed.      Significant Labs:   CBC:   Recent Labs  Lab 03/04/17  1824   WBC 11.80   HGB 17.4   HCT 53.2        CMP:   Recent Labs  Lab 03/04/17  1824 03/05/17  0505     --    K 4.1  --      --    CO2 20*  --      --    BUN 29*  --    CREATININE 1.7* 1.5*   CALCIUM 9.7  --    ANIONGAP 14  --    EGFRNONAA 42* 49*       Significant Imaging: I have reviewed and interpreted all pertinent imaging results/findings within the past 24 hours.

## 2017-03-05 NOTE — SUBJECTIVE & OBJECTIVE
Past Medical History:   Diagnosis Date    Cancer     skin cancer left shoulder    Diabetes mellitus     Hypertension     MI, old 2014       Past Surgical History:   Procedure Laterality Date    CARDIAC PACEMAKER PLACEMENT  2014    CARDIAC PACEMAKER PLACEMENT      EYE SURGERY Bilateral     laser corrective surgery    SHOULDER SURGERY Left     TONSILLECTOMY         Review of patient's allergies indicates:  No Known Allergies    No current facility-administered medications on file prior to encounter.      No current outpatient prescriptions on file prior to encounter.     Family History     None        Social History Main Topics    Smoking status: Current Every Day Smoker     Packs/day: 0.50     Types: Cigarettes    Smokeless tobacco: Not on file    Alcohol use No    Drug use: No    Sexual activity: Not on file     Review of Systems   Constitutional: Negative for chills and fever.   HENT: Negative for congestion and trouble swallowing.    Eyes: Negative for photophobia and discharge.   Respiratory: Negative for cough and chest tightness.    Gastrointestinal: Negative for abdominal pain, diarrhea and vomiting.   Genitourinary: Negative for dysuria and hematuria.   Musculoskeletal: Negative for neck pain and neck stiffness.   Skin: Negative for rash and wound.   Neurological: Positive for facial asymmetry. Negative for seizures, syncope, light-headedness and headaches.   Psychiatric/Behavioral: Negative for dysphoric mood. The patient is not nervous/anxious.      Objective:     Vital Signs (Most Recent):  Temp: 96.8 °F (36 °C) (03/04/17 2020)  Pulse: 64 (03/04/17 2020)  Resp: 18 (03/04/17 2020)  BP: 112/81 (03/04/17 2020)  SpO2: 97 % (03/04/17 2020) Vital Signs (24h Range):  Temp:  [96.8 °F (36 °C)-98.9 °F (37.2 °C)] 96.8 °F (36 °C)  Pulse:  [64-76] 64  Resp:  [16-18] 18  SpO2:  [96 %-97 %] 97 %  BP: (112-127)/(77-81) 112/81     Weight: 113.4 kg (250 lb)  Body mass index is 32.1 kg/(m^2).    Physical Exam    Constitutional: He is oriented to person, place, and time. He appears well-developed and well-nourished. No distress.   HENT:   Head: Normocephalic and atraumatic.   Eyes: Right eye exhibits no discharge. Left eye exhibits no discharge. No scleral icterus.   Neck: Neck supple. No JVD present.   Cardiovascular: Normal rate and regular rhythm.    Pulmonary/Chest: Effort normal and breath sounds normal. No respiratory distress.   Abdominal: Soft. Bowel sounds are normal. He exhibits no distension. There is no tenderness.   Musculoskeletal: He exhibits no edema or tenderness.   Neurological: He is alert and oriented to person, place, and time. He exhibits normal muscle tone.   Skin: Skin is warm and dry. He is not diaphoretic.   Psychiatric: He has a normal mood and affect. His behavior is normal.   Nursing note and vitals reviewed.       Significant Labs:   CBC:   Recent Labs  Lab 03/04/17  1824   WBC 11.80   HGB 17.4   HCT 53.2        CMP:   Recent Labs  Lab 03/04/17  1824      K 4.1      CO2 20*      BUN 29*   CREATININE 1.7*   CALCIUM 9.7   ANIONGAP 14   EGFRNONAA 42*       Significant Imaging:     CT Head:  No acute findings

## 2017-03-05 NOTE — UM SECONDARY REVIEW
Physician Advisor External    Level of Care Issue    Case sent to EHR for review of Continued Stay OBS 3/5/2017

## 2017-03-05 NOTE — PROGRESS NOTES
Metformin therapy for Earnest Wallis [id number] has been evaluated according to the pharmacy practice protocol.      Lab Results   Component Value Date/Time    CREATININE 1.7 (H) 03/04/2017 06:24 PM       Metformin therapy has been discontinued.  Metformin therapy can be resumed once SCr < 1.5 mg/dL or at physician's discretion.    Thank you,  Kelly Costello

## 2017-03-05 NOTE — ASSESSMENT & PLAN NOTE
Serial neurologic exams  Carotid Doppler to rule out stenosis  Echocardiogram to rule out thrombus  Consider increase Aspirin to 325 mg but has ?YAO vs CKD  Discussed importance of smoking cessation  Glycemic and BP control  Consider CTA head to rule out vascular abnormality but has ?YAO vs CKD

## 2017-03-05 NOTE — ASSESSMENT & PLAN NOTE
Serial neurologic exams  Carotid Doppler to rule out stenosis  Echocardiogram to rule out thrombus  Consider increase Aspirin to 325 mg but has ?YAO vs CKD  Discussed importance of smoking cessation  Glycemic and BP control  Consider CTA head to rule out vascular abnormality but has ?YAO vs CKD.     Echo with suspicion for RV thrombus per cardiology.

## 2017-03-05 NOTE — ASSESSMENT & PLAN NOTE
Obtain urine for routine UA  Avoid non-essential nephrotoxins, dose medications according to GFR  Monitor I/O, daily weight. Monitor for volume overload  Check urine sodium, creatinine - calculate FENa  Check urine uric acid - calculate FEUrea  Consider checking urine eosinophils  Consider checking CPK  Consider renal bladder US to rule out obstructive nephropathy

## 2017-03-05 NOTE — PLAN OF CARE
Problem: Patient Care Overview  Goal: Plan of Care Review  Outcome: Ongoing (interventions implemented as appropriate)  No neuro deficits noted with neuro checks  Denies pain  100% paced on telemetry  Monitor lab work this am  2d echo and US of carotid pending  Unable to have MRI secondary to pacer/defibrillator

## 2017-03-05 NOTE — ED NOTES
"Patient identifiers for Earnest Wallis checked and correct.  LOC: Patient is awake, alert, and aware of environment with an appropriate affect. Patient is oriented x 3 and speaking appropriately.  APPEARANCE: Patient resting comfortably and in no acute distress. Patient has neglected hygiene, clothes are soiled and body odor is very strong  SKIN: The skin is warm and dry. Patient has normal skin turgor and moist mucus membrances. Skin is intact; no bruising or breakdown noted.  MUSCULOSKELETAL: Patient is moving all extremities well, no obvious deformities noted. Pulses intact.   RESPIRATORY: Airway is open and patent. Respirations are spontaneous and non-labored with normal effort and rate.  CARDIAC: Patient has a normal rate and rhythm. No peripheral edema noted. Capillary refill < 3 seconds.  ABDOMEN: No distention noted. Bowel sounds active in all 4 quadrants. Soft and non-tender upon palpation.  NEUROLOGICAL: PERRL. Right facial droop is noted, pt reports onset of facial droop around "breakfast" this morning which he estimates to be around 10am but is not certain. Pt reports facial numbness at this time and then notes drooling when he was trying to eat lunch. Hand grasps are equal bilaterally. Normal sensation in all extremities when touched with finger.  Allergies reported: Review of patient's allergies indicates:  No Known Allergies    "

## 2017-03-05 NOTE — H&P
[facility] Hospital Medicine  History & Physical    Patient Name: Earnest Wallis  MRN: [id number]  Admission Date: 3/4/2017  Attending Physician: Babs Mcknight MD   Primary Care Provider: [facility]         Patient information was obtained from patient and ER records.     Subjective:     Principal Problem:TIA (transient ischemic attack)    Chief Complaint:   Chief Complaint   Patient presents with    Facial Droop     R facial droop x 1 hr.        HPI: Mr. Wallis presents for evaluation of RIGHT facial droop, numbness and slurred speech which lasted for 30 minutes. He has not experienced these symptoms in the past. He reports history of cerebral artery aneurysm. He denies history of CVA. He denies trauma. He does not check his BP at home but states it has been well-controlled according to his cardiologist. He states his blood glucose was recently elevated but his DM is normally well-controlled. He smokes a pack of cigarettes daily. He has been smoking for 50 + years. His father  in his 40's from MI. The patient has personal history of CAD. He denies focal weakness or numbness. He denies confusion.    Past Medical History:   Diagnosis Date    Cancer     skin cancer left shoulder    Diabetes mellitus     Hypertension     MI, old        Past Surgical History:   Procedure Laterality Date    CARDIAC PACEMAKER PLACEMENT      CARDIAC PACEMAKER PLACEMENT      EYE SURGERY Bilateral     laser corrective surgery    SHOULDER SURGERY Left     TONSILLECTOMY         Review of patient's allergies indicates:  No Known Allergies    No current facility-administered medications on file prior to encounter.      No current outpatient prescriptions on file prior to encounter.     Family History     None        Social History Main Topics    Smoking status: Current Every Day Smoker     Packs/day: 0.50     Types: Cigarettes    Smokeless tobacco: Not on file     Alcohol use No    Drug use: No    Sexual activity: Not on file     Review of Systems   Constitutional: Negative for chills and fever.   HENT: Negative for congestion and trouble swallowing.    Eyes: Negative for photophobia and discharge.   Respiratory: Negative for cough and chest tightness.    Gastrointestinal: Negative for abdominal pain, diarrhea and vomiting.   Genitourinary: Negative for dysuria and hematuria.   Musculoskeletal: Negative for neck pain and neck stiffness.   Skin: Negative for rash and wound.   Neurological: Positive for facial asymmetry. Negative for seizures, syncope, light-headedness and headaches.   Psychiatric/Behavioral: Negative for dysphoric mood. The patient is not nervous/anxious.      Objective:     Vital Signs (Most Recent):  Temp: 96.8 °F (36 °C) (03/04/17 2020)  Pulse: 64 (03/04/17 2020)  Resp: 18 (03/04/17 2020)  BP: 112/81 (03/04/17 2020)  SpO2: 97 % (03/04/17 2020) Vital Signs (24h Range):  Temp:  [96.8 °F (36 °C)-98.9 °F (37.2 °C)] 96.8 °F (36 °C)  Pulse:  [64-76] 64  Resp:  [16-18] 18  SpO2:  [96 %-97 %] 97 %  BP: (112-127)/(77-81) 112/81     Weight: 113.4 kg (250 lb)  Body mass index is 32.1 kg/(m^2).    Physical Exam   Constitutional: He is oriented to person, place, and time. He appears well-developed and well-nourished. No distress.   HENT:   Head: Normocephalic and atraumatic.   Eyes: Right eye exhibits no discharge. Left eye exhibits no discharge. No scleral icterus.   Neck: Neck supple. No JVD present.   Cardiovascular: Normal rate and regular rhythm.    Pulmonary/Chest: Effort normal and breath sounds normal. No respiratory distress.   Abdominal: Soft. Bowel sounds are normal. He exhibits no distension. There is no tenderness.   Musculoskeletal: He exhibits no edema or tenderness.   Neurological: He is alert and oriented to person, place, and time. He exhibits normal muscle tone.   Skin: Skin is warm and dry. He is not diaphoretic.   Psychiatric: He has a normal  mood and affect. His behavior is normal.   Nursing note and vitals reviewed.       Significant Labs:   CBC:   Recent Labs  Lab 03/04/17  1824   WBC 11.80   HGB 17.4   HCT 53.2        CMP:   Recent Labs  Lab 03/04/17  1824      K 4.1      CO2 20*      BUN 29*   CREATININE 1.7*   CALCIUM 9.7   ANIONGAP 14   EGFRNONAA 42*       Significant Imaging:     CT Head:  No acute findings    Assessment/Plan:     * TIA (transient ischemic attack)  Serial neurologic exams  Carotid Doppler to rule out stenosis  Echocardiogram to rule out thrombus  Consider increase Aspirin to 325 mg but has ?YAO vs CKD  Discussed importance of smoking cessation  Glycemic and BP control  Consider CTA head to rule out vascular abnormality but has ?YAO vs CKD      YAO (acute kidney injury)  Obtain urine for routine UA  Avoid non-essential nephrotoxins, dose medications according to GFR  Monitor I/O, daily weight. Monitor for volume overload  Check urine sodium, creatinine - calculate FENa  Check urine uric acid - calculate FEUrea  Consider checking urine eosinophils  Consider checking CPK  Consider renal bladder US to rule out obstructive nephropathy        Chronic diastolic CHF (congestive heart failure), NYHA class 2  Continue Carvedilol, Furosemide      Type 2 diabetes mellitus with complication  Check BG prior to meals and QHS. Administer rapid-acting insulin according to low-dose sliding scale      Cigarette smoker  Discussed smoking cessation      VTE Risk Mitigation         Ordered     enoxaparin injection 40 mg  Daily     Route:  Subcutaneous        03/04/17 2244     Medium Risk of VTE  Once      03/04/17 2244        Kay Gerber PA-C  Department of Hospital Medicine   [facility]

## 2017-03-06 VITALS
HEART RATE: 75 BPM | SYSTOLIC BLOOD PRESSURE: 111 MMHG | HEIGHT: 74 IN | OXYGEN SATURATION: 97 % | RESPIRATION RATE: 18 BRPM | TEMPERATURE: 98 F | WEIGHT: 250 LBS | DIASTOLIC BLOOD PRESSURE: 76 MMHG | BODY MASS INDEX: 32.08 KG/M2

## 2017-03-06 PROBLEM — I50.42 CHRONIC COMBINED SYSTOLIC AND DIASTOLIC CHF (CONGESTIVE HEART FAILURE): Status: ACTIVE | Noted: 2017-03-05

## 2017-03-06 LAB
ANION GAP SERPL CALC-SCNC: 11 MMOL/L
BASOPHILS # BLD AUTO: 0.1 K/UL
BASOPHILS NFR BLD: 0.6 %
BUN SERPL-MCNC: 23 MG/DL
CALCIUM SERPL-MCNC: 9 MG/DL
CHLORIDE SERPL-SCNC: 104 MMOL/L
CO2 SERPL-SCNC: 25 MMOL/L
CREAT SERPL-MCNC: 1.4 MG/DL
DIASTOLIC DYSFUNCTION: NO
DIFFERENTIAL METHOD: ABNORMAL
EOSINOPHIL # BLD AUTO: 0.3 K/UL
EOSINOPHIL NFR BLD: 3.2 %
ERYTHROCYTE [DISTWIDTH] IN BLOOD BY AUTOMATED COUNT: 14.9 %
EST. GFR  (AFRICAN AMERICAN): >60 ML/MIN/1.73 M^2
EST. GFR  (NON AFRICAN AMERICAN): 53 ML/MIN/1.73 M^2
ESTIMATED PA SYSTOLIC PRESSURE: 17.64
GLUCOSE SERPL-MCNC: 147 MG/DL
HCT VFR BLD AUTO: 47.8 %
HGB BLD-MCNC: 15.6 G/DL
LYMPHOCYTES # BLD AUTO: 2.8 K/UL
LYMPHOCYTES NFR BLD: 36 %
MCH RBC QN AUTO: 29.3 PG
MCHC RBC AUTO-ENTMCNC: 32.6 %
MCV RBC AUTO: 90 FL
MONOCYTES # BLD AUTO: 0.8 K/UL
MONOCYTES NFR BLD: 9.7 %
NEUTROPHILS # BLD AUTO: 4 K/UL
NEUTROPHILS NFR BLD: 50.5 %
PLATELET # BLD AUTO: 168 K/UL
PMV BLD AUTO: 9.5 FL
POCT GLUCOSE: 196 MG/DL (ref 70–110)
POTASSIUM SERPL-SCNC: 4.3 MMOL/L
RBC # BLD AUTO: 5.32 M/UL
RETIRED EF AND QEF - SEE NOTES: 26 (ref 55–65)
SODIUM SERPL-SCNC: 140 MMOL/L
TRICUSPID VALVE REGURGITATION: ABNORMAL
TROPONIN I SERPL DL<=0.01 NG/ML-MCNC: 0.07 NG/ML
WBC # BLD AUTO: 7.9 K/UL

## 2017-03-06 PROCEDURE — 36415 COLL VENOUS BLD VENIPUNCTURE: CPT

## 2017-03-06 PROCEDURE — 84484 ASSAY OF TROPONIN QUANT: CPT

## 2017-03-06 PROCEDURE — 85025 COMPLETE CBC W/AUTO DIFF WBC: CPT

## 2017-03-06 PROCEDURE — 12000002 HC ACUTE/MED SURGE SEMI-PRIVATE ROOM

## 2017-03-06 PROCEDURE — 63600175 PHARM REV CODE 636 W HCPCS: Performed by: NURSE PRACTITIONER

## 2017-03-06 PROCEDURE — 25000003 PHARM REV CODE 250: Performed by: PHYSICIAN ASSISTANT

## 2017-03-06 PROCEDURE — 99223 1ST HOSP IP/OBS HIGH 75: CPT | Mod: 25,,, | Performed by: INTERNAL MEDICINE

## 2017-03-06 PROCEDURE — 25000003 PHARM REV CODE 250: Performed by: NURSE PRACTITIONER

## 2017-03-06 PROCEDURE — 25000003 PHARM REV CODE 250: Performed by: EMERGENCY MEDICINE

## 2017-03-06 PROCEDURE — 80048 BASIC METABOLIC PNL TOTAL CA: CPT

## 2017-03-06 RX ORDER — ATORVASTATIN CALCIUM 40 MG/1
40 TABLET, FILM COATED ORAL DAILY
Status: DISCONTINUED | OUTPATIENT
Start: 2017-03-07 | End: 2017-03-06 | Stop reason: HOSPADM

## 2017-03-06 RX ORDER — LISINOPRIL 5 MG/1
5 TABLET ORAL DAILY
Qty: 90 TABLET | Refills: 1 | Status: ON HOLD | OUTPATIENT
Start: 2017-03-06 | End: 2017-03-22

## 2017-03-06 RX ORDER — ATORVASTATIN CALCIUM 40 MG/1
40 TABLET, FILM COATED ORAL DAILY
Qty: 30 TABLET | Refills: 1 | Status: SHIPPED | OUTPATIENT
Start: 2017-03-07 | End: 2018-03-07

## 2017-03-06 RX ORDER — FUROSEMIDE 40 MG/1
40 TABLET ORAL DAILY PRN
Qty: 30 TABLET | Refills: 0 | Status: SHIPPED | OUTPATIENT
Start: 2017-03-06 | End: 2018-03-06

## 2017-03-06 RX ORDER — LISINOPRIL 2.5 MG/1
5 TABLET ORAL DAILY
Status: DISCONTINUED | OUTPATIENT
Start: 2017-03-06 | End: 2017-03-06 | Stop reason: HOSPADM

## 2017-03-06 RX ORDER — LISINOPRIL 5 MG/1
5 TABLET ORAL DAILY
Qty: 90 TABLET | Refills: 1 | Status: SHIPPED | OUTPATIENT
Start: 2017-03-06 | End: 2017-03-06

## 2017-03-06 RX ADMIN — POTASSIUM CHLORIDE 20 MEQ: 20 TABLET, EXTENDED RELEASE ORAL at 08:03

## 2017-03-06 RX ADMIN — APIXABAN 10 MG: 2.5 TABLET, FILM COATED ORAL at 12:03

## 2017-03-06 RX ADMIN — ASPIRIN 81 MG: 81 TABLET, COATED ORAL at 08:03

## 2017-03-06 RX ADMIN — ENOXAPARIN SODIUM 120 MG: 150 INJECTION SUBCUTANEOUS at 04:03

## 2017-03-06 RX ADMIN — LISINOPRIL 5 MG: 2.5 TABLET ORAL at 12:03

## 2017-03-06 RX ADMIN — ATORVASTATIN CALCIUM 20 MG: 20 TABLET, FILM COATED ORAL at 08:03

## 2017-03-06 RX ADMIN — CARVEDILOL 12.5 MG: 6.25 TABLET, FILM COATED ORAL at 08:03

## 2017-03-06 NOTE — PLAN OF CARE
Problem: Patient Care Overview  Goal: Plan of Care Review  Outcome: Ongoing (interventions implemented as appropriate)  Denies pain  100% paced on telemetry  Administer lovonox as ordered  Monitor for any distress  Neuro checks without deficits

## 2017-03-06 NOTE — PROGRESS NOTES
CM met with patient and he is aware that he will have to take Apixiban scrip to local pharmacy and fill. [facility] will not be [facility] until [facility]. He was instructed to go to [facility] and he can be seen by a triage nurse who will get [facility] to [facility]rip and then can get [facility]. Patient is fine with getting partial scrip filled and he agrees to pay for it himself and will get to [facility] either today or tomorrow.

## 2017-03-06 NOTE — CONSULTS
[facility]  Cardiology  Consult Note    Patient Name: Earnest Wallis  MRN: [id number]  Admission Date: 3/4/2017  Hospital Length of Stay: 0 days  Code Status: Full Code   Attending Provider: Colby Summers MD   Consulting Provider: Anabelle Miller NP  Primary Care Physician: [facility]  Principal Problem:Acute thrombus of left ventricle    Patient information was obtained from patient and medical record.   IP consult to dietary  Consult performed by: ANABELLE MILLER  Consult ordered by: RONALD BURNETT  Reason for consult: apical thrombus       This is a new patient to me, Anabelle Miller NP and to my collaborating physician, Dr. Marin.   Subjective:       HPI:   [facility]  [facility]: [facility] [place]  Patient unsure of cardiologist's name. Last saw 2 weeks ago.      Patient lives alone, recently moved to this area from [place] a few days ago.   Daughter, Daisy, lives in [place], [place]  Patient is prior .       Cardiology consultation for apical thrombus noted on echo done 03/05/2017.      Mr. Wallis is a 60 y/o male with a h/o CAD, prior MI x 2 (first in 2009/10, second in 2014), 2 coronary stents placed in 2009/10, 1 coronary stent in 2014, CHF, AICD (placed 2.5 years ago), HTN, type 2 DM, and current tobacco abuse.      Patient presented to the ED on 03/04/2017 with a c/o acute onset right sided facial numbness and facial droop starting about 1 hour PTA.  He reported associated speech difficulty d/t to his facial droop and numbness.  Patient's symptoms had improved prior to arrival in the ED.  Prior to developing these symptoms, patient had driven 18 hours straight from [place] to move into a new apartment.  Reports having some chronic BLE swelling which is at baseline.  Denies associated chest pain, palpitations, SOB, orthopnea, headaches, vision changes, confusion, syncope, back pain, neck pain,  "abdominal pain, and n/v.      Clinical picture was concerning for TIA and the patient was admitted for further observation. Head CT showed a small chronic right cerebellar infarct without evidence of an acute intracranial process. MRI could not be obtained d/t AICD not being MRI compatible. Carotid US did not reveal any significant stenosis.  BUN/Creatine of 29/1.7 with a GFR of 42 initially, now 23/1.4 with GFR of 53 raising the question of YAO vs CKD.  Lipid panel done 03/05/2017 showed an LDL of 76.6 and a non-HDL of 103.  Patient is on lipitor 20mg daily at home. EKG showed electronic ventricular pacer, rate of 68 bpm, wide QRS.  Echo done on 03/05/2017 showed moderately to severely depressed LVEF of 25-30%, 1.5 cm sessile apical thrombus with multiple wall motion abnormalities consistent with prior apical infarction and multivessel disease (additional results below).     Patient denies prior history of CVA.  He does not check his BP at home but reports that it has been well controlled by his cardiologist. [facility] [place] [facility] ago and was told "everything was ok." Patient reports that he had an echo done about 6 months ago and expresses that he was not made aware of any abnormal results.  DM normally well controlled but reports high glucose readings up to 180 lately. Patient continues to smoke cigarettes (about 1 ppd).  He has been smoking for 50+ years.  Patient reports mostly sedentary activity stating that for the past 2.5 years, he can only walk about 2.5 blocks before getting weak.  He mentions that he has been compliant with medications, denies missed doses.        Past Medical History:   Diagnosis Date    Cancer     skin cancer left shoulder    Diabetes mellitus     Hypertension     MI, old 2014       Past Surgical History:   Procedure Laterality Date    CARDIAC PACEMAKER PLACEMENT  2014    CARDIAC PACEMAKER PLACEMENT      EYE SURGERY Bilateral     " laser corrective surgery    SHOULDER SURGERY Left     TONSILLECTOMY         Review of patient's allergies indicates:  No Known Allergies    No current facility-administered medications on file prior to encounter.      No current outpatient prescriptions on file prior to encounter.     Family History     None        Social History Main Topics    Smoking status: Current Every Day Smoker     Packs/day: 0.50     Types: Cigarettes    Smokeless tobacco: Not on file    Alcohol use No    Drug use: No    Sexual activity: Not on file     Review of Systems   Constitution: Positive for malaise/fatigue (fatigue). Negative for chills, diaphoresis, fever, weakness, weight gain and weight loss.   HENT: Negative for congestion, headaches and sore throat.    Eyes: Negative for visual disturbance.   Cardiovascular: Positive for leg swelling. Negative for chest pain, dyspnea on exertion, irregular heartbeat, near-syncope, orthopnea and palpitations.   Respiratory: Positive for cough (chronic, no change. ). Negative for hemoptysis, shortness of breath, sleep disturbances due to breathing and wheezing.    Endocrine: Negative for cold intolerance, heat intolerance, polydipsia, polyphagia and polyuria.   Hematologic/Lymphatic: Does not bruise/bleed easily.   Skin: Negative for color change and rash.   Musculoskeletal: Negative for arthritis, back pain, joint swelling and neck pain.   Gastrointestinal: Negative for abdominal pain, constipation, diarrhea, heartburn, hematochezia, melena, nausea and vomiting.   Genitourinary: Negative for dysuria and hematuria.   Neurological: Negative for dizziness, focal weakness and light-headedness.        + right sided facial numbness and facial droop with difficulty speaking (now resolved).    Psychiatric/Behavioral: Negative for depression and suicidal ideas. The patient is not nervous/anxious.    Allergic/Immunologic: Positive for environmental allergies.     Objective:     Vital Signs (Most  Recent):  Temp: 97.8 °F (36.6 °C) (03/06/17 0755)  Pulse: 68 (03/06/17 0755)  Resp: 20 (03/06/17 0755)  BP: 120/85 (03/06/17 0755)  SpO2: 97 % (03/06/17 0755) Vital Signs (24h Range):  Temp:  [97 °F (36.1 °C)-97.9 °F (36.6 °C)] 97.8 °F (36.6 °C)  Pulse:  [60-68] 68  Resp:  [16-20] 20  SpO2:  [93 %-97 %] 97 %  BP: ()/(51-85) 120/85     Weight: 113.4 kg (250 lb)  Body mass index is 32.1 kg/(m^2).    SpO2: 97 %  O2 Device (Oxygen Therapy): room air      Intake/Output Summary (Last 24 hours) at 03/06/17 0853  Last data filed at 03/06/17 0552   Gross per 24 hour   Intake             1740 ml   Output              675 ml   Net             1065 ml       Lines/Drains/Airways     Peripheral Intravenous Line                 Peripheral IV - Single Lumen 03/06/17 0547 Left Wrist less than 1 day                Physical Exam   Constitutional: He is oriented to person, place, and time. He appears well-developed and well-nourished. No distress.   HENT:   Head: Normocephalic and atraumatic.   Eyes: Conjunctivae and EOM are normal. Pupils are equal, round, and reactive to light. Right eye exhibits no discharge. Left eye exhibits no discharge. No scleral icterus.   Neck: Normal range of motion. Normal carotid pulses present. Carotid bruit is not present.   No obvious JVD.    Cardiovascular: Normal rate and regular rhythm.    Pulses:       Radial pulses are 2+ on the right side, and 2+ on the left side.        Dorsalis pedis pulses are 1+ on the right side, and 1+ on the left side.   Distant heart tones.    Pulmonary/Chest: Effort normal and breath sounds normal. He has no wheezes. He has no rales. He exhibits no tenderness.   Abdominal: Soft. Bowel sounds are normal. There is no tenderness. There is no guarding.   Musculoskeletal:   Trace pitting edema BLE.     Neurological: He is alert and oriented to person, place, and time.   No focal deficits.    Skin: Skin is warm and dry. He is not diaphoretic. No cyanosis. Nails show no  clubbing.   Psychiatric: He has a normal mood and affect.   Vitals reviewed.      Significant Labs:   BMP:   Recent Labs  Lab 03/04/17  1824 03/05/17  0505 03/06/17  0434     --  147*     --  140   K 4.1  --  4.3     --  104   CO2 20*  --  25   BUN 29*  --  23   CREATININE 1.7* 1.5* 1.4   CALCIUM 9.7  --  9.0   , CMP   Recent Labs  Lab 03/04/17  1824 03/05/17  0505 03/06/17  0434     --  140   K 4.1  --  4.3     --  104   CO2 20*  --  25     --  147*   BUN 29*  --  23   CREATININE 1.7* 1.5* 1.4   CALCIUM 9.7  --  9.0   ANIONGAP 14  --  11   ESTGFRAFRICA 49* 56* >60   EGFRNONAA 42* 49* 53*   , CBC   Recent Labs  Lab 03/04/17  1824 03/06/17  0434   WBC 11.80 7.90   HGB 17.4 15.6   HCT 53.2 47.8    168    and Lipid Panel   Recent Labs  Lab 03/05/17  0505   CHOL 133   HDL 30*   LDLCALC 76.6   TRIG 132   CHOLHDL 22.6       Significant Imaging:   Echo 03/05/2017:  CONCLUSIONS     1 - Enlarged left ventricular enlargement.     2 - Eccentric hypertrophy.     3 - Moderately to severely depressed left ventricular systolic function (EF 25-30%).     4 - Multiple regional wall motion abnormalities consistent with prior apical infarction and multivessel disease..     5 - 1.5 cm sessile apical thrombus.     6 - Normal left ventricular diastolic function.     7 - Right ventricular enlargement with normal systolic function.     8 - The estimated PA systolic pressure is greater than 18 mmHg.     9 - Difficult apical windows, Optison contrast used for wall motion analysis    Bilateral Carotid US 03/05/2017:  No significant carotid stenosis.    Head CT 03/04/2017:  No acute intracranial process.   Small chronic right cerebellar infarct.    Assessment and Plan:         Active Hospital Problems    Diagnosis    *Acute thrombus of left ventricle    Chronic combined systolic and diastolic CHF (congestive heart failure)    Type 2 diabetes mellitus with complication    Moderate tobacco  dependence    YAO (acute kidney injury)    TIA (transient ischemic attack)     Mr. Wallis is a 60 y/o male with a h/o CAD, prior MI x 2 (first in 2009/10, second in 2014), 2 coronary stents placed in 2009/10, 1 coronary stent in 2014, CHF, AICD (placed 2.5 years ago), HTN, type 2 DM, and current tobacco abuse.  Patient recently relocated to this area from [place] a few days ago.      Patient was initially admitted on 03/04 after presenting to the ED with a c/o right sided facial numbness and facial droop that had improved PTA.  Cardiology consultation was requested for LV thrombus. Echo done on 03/05/2017 showed moderately to severely depressed LVEF of 25-30%, 1.5 cm sessile apical thrombus with multiple wall motion abnormalities consistent with prior apical infarction and multivessel disease (additional results above).   Patient currently receiving full dose Lovenox.  Initial BUN/Creatine of 29/1.7 with a GFR of 42 raising the question of YAO vs CKD. BUN/Creatinine now 23/1.4 with GFR of 53.  Lipid panel done 03/05/2017 showed an LDL of 76.6 and a non-HDL of 103.  EKG showed electronic ventricular pacer, rate of 68 bpm, wide QRS.  Tropnonin slightly elevated at 0.066.     - Given presence of LV thrombus and risk for embolizaton, patient has anticoagulant need.  Currently receiving full dose Lovenox.  Will need to transition to OAC/NOAC. Discussed Eliquis 5mg po twice daily as per Dr. Marin's recommendation.    - Needs to optimize medications and reduce risks     - ACEI added - Continue Lisinopril      - Continue daily ASA 81mg      - Continue BB (titrate up slowly) and statin (lipitor 20mg daily increased to 40mg daily).       - Has been on lasix at home.  Patient did receive one dose of Lasix while inpatient before it was held given concern of YAO.  Creatinine now 1.4.  Consider resuming Lasix or Aldactone as renal function improves.      - low sodium diet     - smoking cessation   - f/u closely in  cardiology clinic - 2 weeks       Anabelle Miller NP  Cardiology   [facility]

## 2017-03-06 NOTE — NURSING
Spoke with patient at bedside.  [facility] local [facility] [place].  Patient will follow up and get established with [facility], first thing in the AM. Patient instructed to fill medications at local pharmacy, until established with [facility].  Patient verbalized understanding.  MIK Murillo RN

## 2017-03-06 NOTE — UM SECONDARY REVIEW
Physician Advisor External    Level of Care Issue    Approved Observation per ehr review, pt meets INPY criteria by IQ...will discuss with attending....

## 2017-03-06 NOTE — PLAN OF CARE
CM met with patient and verified insurance and demographic information. Patient reports just moving here on Sat. Morning and rents a cottage from Gisselle Pinto. Patient does not have any family here. Patient's next of kin is his daughter, Miguelina Billings [phone] who lives out of town. Patient does not have local pharmacy or PCP since he just moved here. CM left message for Nubli to obtain correct physical address. Patient reports being independent, drives and does not use any equipment.   1239pm Correct physical address is [id number] [phone]     03/06/17 1230   Discharge Assessment   Assessment Type Discharge Planning Assessment   Confirmed/corrected address and phone number on facesheet? Yes   Assessment information obtained from? Patient   If Healthcare Directive is received, is it scanned into Epic? yes   Prior to hospitilization cognitive status: Alert/Oriented   Prior to hospitalization functional status: Independent   Current cognitive status: Alert/Oriented   Current Functional Status: Independent   Lives With alone   Able to Return to Prior Arrangements yes   Is patient able to care for self after discharge? Yes   How many people do you have in your home that can help with your care after discharge? 0   Patient's perception of discharge disposition home or selfcare   Readmission Within The Last 30 Days no previous admission in last 30 days   Patient currently being followed by outpatient case management? No   Patient currently receives home health services? No   Does the patient currently use HME? No   Patient currently receives private duty nursing? No   Patient currently receives any other outside agency services? No   Equipment Currently Used at Home none   Do you have any problems affording any of your prescribed medications? No   Is the patient taking medications as prescribed? yes   Do you have any financial concerns preventing you from receiving the healthcare you need? No    Does the patient have transportation to healthcare appointments? Yes   Does the patient receive services at the [facility]? No   Are there any open cases? No   Discharge Plan A Home   Patient/Family In Agreement With Plan yes

## 2017-03-06 NOTE — PROGRESS NOTES
Educated patient on Low sodium diet. See education tab. Provided nutrition packet on Low Sodium diet with email address should questions arise.

## 2017-03-06 NOTE — PROGRESS NOTES
CM called Dr. Marin's office, no appointment available until May. Office will call patient with 2 week appointment.

## 2017-03-06 NOTE — DISCHARGE SUMMARY
[facility] Hospital Medicine  Discharge Summary      Patient Name: Earnest Wallis  MRN: [id number]  Admission Date: 3/4/2017  Hospital Length of Stay: 1 days  Discharge Date and Time: 3/6/2017  4:22 PM  Attending Physician: No att. providers found   Discharging Provider: Amanda Warner NP  Primary Care Provider: [facility]      HPI:   Mr. Wallis presents for evaluation of RIGHT facial droop, numbness and slurred speech which lasted for 30 minutes. He has not experienced these symptoms in the past. He reports history of cerebral artery aneurysm. He denies history of CVA. He denies trauma. He does not check his BP at home but states it has been well-controlled according to his cardiologist. He states his blood glucose was recently elevated but his DM is normally well-controlled. He smokes a pack of cigarettes daily. He has been smoking for 50 + years. His father  in his 40's from MI. The patient has personal history of CAD. He denies focal weakness or numbness. He denies confusion.    * No surgery found *      Indwelling Lines/Drains at time of discharge:   Lines/Drains/Airways          No matching active lines, drains, or airways        Hospital Course:   Telemetry. CT head negative for acute findings. Patient noted to have mild YAO however refused IV hydration. Renal fx improved with oral hydration. Carotid US demonstrated no significant atherosclerosis. EChocardiogram demonstrated apical thrombus therefore patient initiated on Lovenox SQ and transitioned to oral anticoagulation. Cardiology consulted. Patient cleared by cardiology for DC    Patient examined: heart: RRR.       Long discussion with patient regarding importance of compliance of oral anticoagulation.  Recommend taking ACEI due to systolic heart failure. Lisinopril 5 mg daily. Monitor BP closely.            Consults:   Consults         Status Ordering Provider     Inpatient consult to  Cardiology  Once     Provider:  Arash Marin MD    Acknowledged RONALD BURNETT     Inpatient consult to Heart Failure Nurse  Once     Provider:  (Not yet assigned)    Acknowledged RONALD BURNETT     IP consult to dietary  Once     Provider:  (Not yet assigned)    Completed RONALD BURNETT          Significant Diagnostic Studies: Labs:   BMP:   Recent Labs  Lab 03/04/17 1824 03/05/17  0505 03/06/17  0434     --  147*     --  140   K 4.1  --  4.3     --  104   CO2 20*  --  25   BUN 29*  --  23   CREATININE 1.7* 1.5* 1.4   CALCIUM 9.7  --  9.0    and CMP   Recent Labs  Lab 03/04/17 1824 03/05/17  0505 03/06/17  0434     --  140   K 4.1  --  4.3     --  104   CO2 20*  --  25     --  147*   BUN 29*  --  23   CREATININE 1.7* 1.5* 1.4   CALCIUM 9.7  --  9.0   ANIONGAP 14  --  11   ESTGFRAFRICA 49* 56* >60   EGFRNONAA 42* 49* 53*       Pending Diagnostic Studies:     None        Final Active Diagnoses:    Diagnosis Date Noted POA    PRINCIPAL PROBLEM:  Acute thrombus of left ventricle [I21.3] 03/05/2017 Yes    Chronic combined systolic and diastolic CHF (congestive heart failure) [I50.42] 03/05/2017 Yes    Type 2 diabetes mellitus with complication [E11.8] 03/05/2017 Yes    Moderate tobacco dependence [F17.200] 03/05/2017 Yes    YAO (acute kidney injury) [N17.9] 03/05/2017 Yes    TIA (transient ischemic attack) [G45.9] 03/04/2017 Yes      Problems Resolved During this Admission:    Diagnosis Date Noted Date Resolved POA      No new Assessment & Plan notes have been filed under this hospital service since the last note was generated.  Service: Hospital Medicine      Discharged Condition: stable    Disposition: Home or Self Care    Follow Up:  Follow-up Information     Follow up with Arash Marin MD On 3/20/2017.    Specialty:  Cardiology    Why:  hospital follow up, MD office will call patient with appointment date/time.     Contact information:    [address]  [address]  [place] [address]  [phone]          Follow up with [facility] [place] In 2 days.    Why:  meet with triage nurse to get set up with Primary care md as soon as possible to [facility].    Contact information:    [address] [place] [address]  [phone]        Patient Instructions:     Diet general   Order Specific Question Answer Comments   Additional restrictions: Cardiac (Low Na/Chol)      Diet general   Order Specific Question Answer Comments   Additional restrictions: Cardiac (Low Na/Chol)      Activity as tolerated     Call MD for:  persistent nausea and vomiting or diarrhea     Call MD for:  persistent dizziness, light-headedness, or visual disturbances     Activity as tolerated     Call MD for:  difficulty breathing or increased cough     Call MD for:  persistent dizziness, light-headedness, or visual disturbances       Medications:  Reconciled Home Medications:   Discharge Medication List as of 3/6/2017  2:59 PM      START taking these medications    Details   aspirin (ECOTRIN) 81 MG EC tablet Take 1 tablet (81 mg total) by mouth once daily., Starting 3/5/2017, Until Mon 3/5/18, OTC      apixaban 5 mg Tab Take 2 tablets (10 mg total) by mouth 2 (two) times daily. 10 mg twice daily x 10 days then 5 mg twice daily., Starting 3/6/2017, Until Discontinued, Print      lisinopril (PRINIVIL,ZESTRIL) 5 MG tablet Take 1 tablet (5 mg total) by mouth once daily., Starting 3/6/2017, Until Tue 3/6/18, Print         CONTINUE these medications which have CHANGED    Details   furosemide (LASIX) 40 MG tablet Take 1 tablet (40 mg total) by mouth daily as needed., Starting 3/6/2017, Until Tue 3/6/18, Print         CONTINUE these medications which have NOT CHANGED    Details   carvedilol (COREG) 25 MG tablet Take 12.5 mg by mouth 2 (two) times daily with meals., Until Discontinued, Historical Med      insulin NPH-insulin regular, 70/30, (NOVOLIN 70/30) 100 unit/mL  (70-30) injection Inject 30 Units into the skin once daily., Until Discontinued, Historical Med      metformin (GLUCOPHAGE) 1000 MG tablet Take 1,000 mg by mouth 2 (two) times daily with meals., Until Discontinued, Historical Med      potassium chloride SA (K-DUR,KLOR-CON) 20 MEQ tablet Take 20 mEq by mouth once daily., Until Discontinued, Historical Med      atorvastatin (LIPITOR) 20 MG tablet Take 20 mg by mouth once daily., Until Discontinued, Historical Med           Time spent on the discharge of patient: 45 minutes    Multiple discussions with case management regarding [facility]. Patient required script for few pill at [facility] and [facility]. Instructed patient if any problem arises, call the hospital for assistance.     Amanda Warner NP  Department of Hospital Medicine  [facility]

## 2017-03-06 NOTE — PROGRESS NOTES
[facility] Hospital Medicine  Progress Note    Patient Name: Earnest Wallis  MRN: [id number]  Patient Class: IP- Inpatient   Admission Date: 3/4/2017  Length of Stay: 0 days  Attending Physician: Colby Summers MD  Primary Care Provider: [facility]        Subjective:     Principal Problem:Acute thrombus of left ventricle    HPI:  Mr. Wallis presents for evaluation of RIGHT facial droop, numbness and slurred speech which lasted for 30 minutes. He has not experienced these symptoms in the past. He reports history of cerebral artery aneurysm. He denies history of CVA. He denies trauma. He does not check his BP at home but states it has been well-controlled according to his cardiologist. He states his blood glucose was recently elevated but his DM is normally well-controlled. He smokes a pack of cigarettes daily. He has been smoking for 50 + years. His father  in his 40's from MI. The patient has personal history of CAD. He denies focal weakness or numbness. He denies confusion.    Hospital Course:       Interval History: No new issues overnight. Facial droop resolved.  Echo reviewed. EF 26% + apical thrombus.   On Lovenox.  Denies chest pain and SOB     Review of Systems   Respiratory: Negative for cough, shortness of breath and wheezing.    Cardiovascular: Negative for chest pain, palpitations and leg swelling.   Gastrointestinal: Negative for abdominal pain and nausea.   Neurological: Negative for dizziness, syncope, speech difficulty and light-headedness. Facial asymmetry: right facial droop.   Hematological: Negative for adenopathy.   Psychiatric/Behavioral: Negative for agitation and confusion. The patient is not nervous/anxious.      Objective:     Vital Signs (Most Recent):  Temp: 97.8 °F (36.6 °C) (17)  Pulse: 68 (17)  Resp: 20 (17)  BP: 120/85 (17)  SpO2: 97 % (17) Vital Signs (24h  Range):  Temp:  [97 °F (36.1 °C)-97.9 °F (36.6 °C)] 97.8 °F (36.6 °C)  Pulse:  [60-68] 68  Resp:  [16-20] 20  SpO2:  [93 %-97 %] 97 %  BP: ()/(51-85) 120/85     Weight: 113.4 kg (250 lb)  Body mass index is 32.1 kg/(m^2).    Intake/Output Summary (Last 24 hours) at 03/06/17 1107  Last data filed at 03/06/17 0924   Gross per 24 hour   Intake             2080 ml   Output              675 ml   Net             1405 ml      Physical Exam   Constitutional: He is oriented to person, place, and time. He appears well-developed and well-nourished.   HENT:   Head: Normocephalic and atraumatic.   Mouth/Throat: Oropharynx is clear and moist.   Poor oral dentition    Eyes: Conjunctivae and EOM are normal. Pupils are equal, round, and reactive to light.   Neck: Normal range of motion. Neck supple. No thyromegaly present.   Cardiovascular: Normal rate, regular rhythm, normal heart sounds and intact distal pulses.    No murmur heard.  Pulmonary/Chest: Breath sounds normal. No respiratory distress. He has no wheezes.   Abdominal: Soft. Bowel sounds are normal. He exhibits no distension.   Musculoskeletal: Normal range of motion.   Neurological: He is alert and oriented to person, place, and time. No cranial nerve deficit.   Skin: Skin is warm and dry.   Psychiatric: He has a normal mood and affect. His behavior is normal.   Nursing note and vitals reviewed.      Significant Labs:   CBC:     Recent Labs  Lab 03/04/17 1824 03/06/17  0434   WBC 11.80 7.90   HGB 17.4 15.6   HCT 53.2 47.8    168     CMP:     Recent Labs  Lab 03/04/17  1824 03/05/17  0505 03/06/17  0434     --  140   K 4.1  --  4.3     --  104   CO2 20*  --  25     --  147*   BUN 29*  --  23   CREATININE 1.7* 1.5* 1.4   CALCIUM 9.7  --  9.0   ANIONGAP 14  --  11   EGFRNONAA 42* 49* 53*       Significant Imaging: I have reviewed and interpreted all pertinent imaging results/findings within the past 24 hours.  Carotid US. No acute findings.      Echo. CONCLUSIONS     1 - Enlarged left ventricular enlargement.     2 - Eccentric hypertrophy.     3 - Moderately to severely depressed left ventricular systolic function (EF 25-30%).     4 - Multiple regional wall motion abnormalities consistent with prior apical infarction and multivessel disease..     5 - 1.5 cm sessile apical thrombus.     6 - Normal left ventricular diastolic function.     7 - Right ventricular enlargement with normal systolic function.     8 - The estimated PA systolic pressure is greater than 18 mmHg.     9 - Difficult apical windows, Optison contrast used for wall motion analysis.       Assessment/Plan:      * Acute thrombus of left ventricle  Initiate Lovenox 1mg/kg bid. Consult cardiology.  Check troponin. Home on oral anticoagulation       TIA (transient ischemic attack)  Serial neurologic exams  Carotid Doppler to rule out stenosis  Echocardiogram to rule out thrombus  Consider increase Aspirin to 325 mg but has ?YAO vs CKD  Discussed importance of smoking cessation  Glycemic and BP control  Consider CTA head to rule out vascular abnormality but has ?YAO vs CKD.     Echo with suspicion for RV thrombus per cardiology.       Chronic combined systolic and diastolic CHF (congestive heart failure)  Continue Carvedilol, Furosemide.  Echo with EF 26%.       Type 2 diabetes mellitus with complication  Check BG prior to meals and QHS. Administer rapid-acting insulin according to low-dose sliding scale    CKD stage 3 due to DM type 2.   Trend renal function. Now at baseline      Moderate tobacco dependence  Discussed smoking cessation      YAO (acute kidney injury)  Obtain urine for routine UA  Avoid non-essential nephrotoxins, dose medications according to GFR  Monitor I/O, daily weight. Monitor for volume overload  Consider renal bladder US to rule out obstructive nephropathy  Likely due to dehydration. Patient refusing IVF. Encouraged oral hydration   Resolved.         VTE Risk Mitigation          Ordered     Medium Risk of VTE  Once      03/04/17 1114        Discussed POC with patient. Awaiting cardiology evaluation.     Amanda Warner NP  Department of Hospital Medicine   [facility]

## 2017-03-06 NOTE — PLAN OF CARE
"Problem: Patient Care Overview  Goal: Plan of Care Review  Outcome: Ongoing (interventions implemented as appropriate)  POC reviewed with pt, pt verbalized understanding. Safety maintained throughout shift, bed locked and in lowest position, call light in reach, Side rails up X 2. Non skid sock on when OOB. Pt remained free of fall/trauma. Pt denies pain throughout shift. 2D echo done this afternoon. Pt has refused to have IV fluid administered at this time. Pt was educated on the benefits of IV fluids to assist with his dehydration but pt still refused IV fluids. Pt states" I will drink coffee and water, no fluids are going in my arm". Informed Amanda Warner NP of pt's refusal of IV fluids. Pt ambulating around room independently.  Pt tolerating meals wells, no reports of nausea and vomiting. POCT glucose checked, no coverage needed. Telemetry placed, Paced rhythm .  Will continue to monitor.               "

## 2017-03-06 NOTE — SUBJECTIVE & OBJECTIVE
Interval History: No new issues overnight. Facial droop resolved.  Echo reviewed. EF 26% + apical thrombus.   On Lovenox.  Denies chest pain and SOB     Review of Systems   Respiratory: Negative for cough, shortness of breath and wheezing.    Cardiovascular: Negative for chest pain, palpitations and leg swelling.   Gastrointestinal: Negative for abdominal pain and nausea.   Neurological: Negative for dizziness, syncope, speech difficulty and light-headedness. Facial asymmetry: right facial droop.   Hematological: Negative for adenopathy.   Psychiatric/Behavioral: Negative for agitation and confusion. The patient is not nervous/anxious.      Objective:     Vital Signs (Most Recent):  Temp: 97.8 °F (36.6 °C) (03/06/17 0755)  Pulse: 68 (03/06/17 0755)  Resp: 20 (03/06/17 0755)  BP: 120/85 (03/06/17 0755)  SpO2: 97 % (03/06/17 0755) Vital Signs (24h Range):  Temp:  [97 °F (36.1 °C)-97.9 °F (36.6 °C)] 97.8 °F (36.6 °C)  Pulse:  [60-68] 68  Resp:  [16-20] 20  SpO2:  [93 %-97 %] 97 %  BP: ()/(51-85) 120/85     Weight: 113.4 kg (250 lb)  Body mass index is 32.1 kg/(m^2).    Intake/Output Summary (Last 24 hours) at 03/06/17 1107  Last data filed at 03/06/17 0924   Gross per 24 hour   Intake             2080 ml   Output              675 ml   Net             1405 ml      Physical Exam   Constitutional: He is oriented to person, place, and time. He appears well-developed and well-nourished.   HENT:   Head: Normocephalic and atraumatic.   Mouth/Throat: Oropharynx is clear and moist.   Poor oral dentition    Eyes: Conjunctivae and EOM are normal. Pupils are equal, round, and reactive to light.   Neck: Normal range of motion. Neck supple. No thyromegaly present.   Cardiovascular: Normal rate, regular rhythm, normal heart sounds and intact distal pulses.    No murmur heard.  Pulmonary/Chest: Breath sounds normal. No respiratory distress. He has no wheezes.   Abdominal: Soft. Bowel sounds are normal. He exhibits no distension.    Musculoskeletal: Normal range of motion.   Neurological: He is alert and oriented to person, place, and time. No cranial nerve deficit.   Skin: Skin is warm and dry.   Psychiatric: He has a normal mood and affect. His behavior is normal.   Nursing note and vitals reviewed.      Significant Labs:   CBC:     Recent Labs  Lab 03/04/17 1824 03/06/17  0434   WBC 11.80 7.90   HGB 17.4 15.6   HCT 53.2 47.8    168     CMP:     Recent Labs  Lab 03/04/17 1824 03/05/17  0505 03/06/17  0434     --  140   K 4.1  --  4.3     --  104   CO2 20*  --  25     --  147*   BUN 29*  --  23   CREATININE 1.7* 1.5* 1.4   CALCIUM 9.7  --  9.0   ANIONGAP 14  --  11   EGFRNONAA 42* 49* 53*       Significant Imaging: I have reviewed and interpreted all pertinent imaging results/findings within the past 24 hours.  Carotid US. No acute findings.     Echo. CONCLUSIONS     1 - Enlarged left ventricular enlargement.     2 - Eccentric hypertrophy.     3 - Moderately to severely depressed left ventricular systolic function (EF 25-30%).     4 - Multiple regional wall motion abnormalities consistent with prior apical infarction and multivessel disease..     5 - 1.5 cm sessile apical thrombus.     6 - Normal left ventricular diastolic function.     7 - Right ventricular enlargement with normal systolic function.     8 - The estimated PA systolic pressure is greater than 18 mmHg.     9 - Difficult apical windows, Optison contrast used for wall motion analysis.

## 2017-03-06 NOTE — ASSESSMENT & PLAN NOTE
Obtain urine for routine UA  Avoid non-essential nephrotoxins, dose medications according to GFR  Monitor I/O, daily weight. Monitor for volume overload  Consider renal bladder US to rule out obstructive nephropathy  Likely due to dehydration. Patient refusing IVF. Encouraged oral hydration   Resolved.

## 2017-03-06 NOTE — NURSING
Spoke with patient, medication teaching regarding importance of taking medication as prescribed this evening (apixiban).  Patient states he is leaving the hospital, picking up his keys, and going to [facility] on [address] to get medication filled for todays PM dose.  Verbalized understanding.  Reported to nurse, and SHIKHA SHAW. MIK Murillo RN Case Mgmt.

## 2017-03-06 NOTE — SUBJECTIVE & OBJECTIVE
Past Medical History:   Diagnosis Date    Cancer     skin cancer left shoulder    Diabetes mellitus     Hypertension     MI, old 2014       Past Surgical History:   Procedure Laterality Date    CARDIAC PACEMAKER PLACEMENT  2014    CARDIAC PACEMAKER PLACEMENT      EYE SURGERY Bilateral     laser corrective surgery    SHOULDER SURGERY Left     TONSILLECTOMY         Review of patient's allergies indicates:  No Known Allergies    No current facility-administered medications on file prior to encounter.      No current outpatient prescriptions on file prior to encounter.     Family History     None        Social History Main Topics    Smoking status: Current Every Day Smoker     Packs/day: 0.50     Types: Cigarettes    Smokeless tobacco: Not on file    Alcohol use No    Drug use: No    Sexual activity: Not on file     Review of Systems   Constitution: Positive for malaise/fatigue (fatigue). Negative for chills, diaphoresis, fever, weakness, weight gain and weight loss.   HENT: Negative for congestion, headaches and sore throat.    Eyes: Negative for visual disturbance.   Cardiovascular: Positive for leg swelling. Negative for chest pain, dyspnea on exertion, irregular heartbeat, near-syncope, orthopnea and palpitations.   Respiratory: Positive for cough (chronic, no change. ). Negative for hemoptysis, shortness of breath, sleep disturbances due to breathing and wheezing.    Endocrine: Negative for cold intolerance, heat intolerance, polydipsia, polyphagia and polyuria.   Hematologic/Lymphatic: Does not bruise/bleed easily.   Skin: Negative for color change and rash.   Musculoskeletal: Negative for arthritis, back pain, joint swelling and neck pain.   Gastrointestinal: Negative for abdominal pain, constipation, diarrhea, heartburn, hematochezia, melena, nausea and vomiting.   Genitourinary: Negative for dysuria and hematuria.   Neurological: Negative for dizziness, focal weakness and light-headedness.         + right sided facial numbness and facial droop with difficulty speaking (now resolved).    Psychiatric/Behavioral: Negative for depression and suicidal ideas. The patient is not nervous/anxious.    Allergic/Immunologic: Positive for environmental allergies.     Objective:     Vital Signs (Most Recent):  Temp: 97.8 °F (36.6 °C) (03/06/17 0755)  Pulse: 68 (03/06/17 0755)  Resp: 20 (03/06/17 0755)  BP: 120/85 (03/06/17 0755)  SpO2: 97 % (03/06/17 0755) Vital Signs (24h Range):  Temp:  [97 °F (36.1 °C)-97.9 °F (36.6 °C)] 97.8 °F (36.6 °C)  Pulse:  [60-68] 68  Resp:  [16-20] 20  SpO2:  [93 %-97 %] 97 %  BP: ()/(51-85) 120/85     Weight: 113.4 kg (250 lb)  Body mass index is 32.1 kg/(m^2).    SpO2: 97 %  O2 Device (Oxygen Therapy): room air      Intake/Output Summary (Last 24 hours) at 03/06/17 0853  Last data filed at 03/06/17 0552   Gross per 24 hour   Intake             1740 ml   Output              675 ml   Net             1065 ml       Lines/Drains/Airways     Peripheral Intravenous Line                 Peripheral IV - Single Lumen 03/06/17 0547 Left Wrist less than 1 day                Physical Exam   Constitutional: He is oriented to person, place, and time. He appears well-developed and well-nourished. No distress.   HENT:   Head: Normocephalic and atraumatic.   Eyes: Conjunctivae and EOM are normal. Pupils are equal, round, and reactive to light. Right eye exhibits no discharge. Left eye exhibits no discharge. No scleral icterus.   Neck: Normal range of motion. Normal carotid pulses present. Carotid bruit is not present.   No obvious JVD.    Cardiovascular: Normal rate and regular rhythm.    Pulses:       Radial pulses are 2+ on the right side, and 2+ on the left side.        Dorsalis pedis pulses are 1+ on the right side, and 1+ on the left side.   Distant heart tones.    Pulmonary/Chest: Effort normal and breath sounds normal. He has no wheezes. He has no rales. He exhibits no tenderness.   Abdominal:  Soft. Bowel sounds are normal. There is no tenderness. There is no guarding.   Musculoskeletal:   Trace pitting edema BLE.     Neurological: He is alert and oriented to person, place, and time.   No focal deficits.    Skin: Skin is warm and dry. He is not diaphoretic. No cyanosis. Nails show no clubbing.   Psychiatric: He has a normal mood and affect.   Vitals reviewed.      Significant Labs:   BMP:   Recent Labs  Lab 03/04/17 1824 03/05/17 0505 03/06/17  0434     --  147*     --  140   K 4.1  --  4.3     --  104   CO2 20*  --  25   BUN 29*  --  23   CREATININE 1.7* 1.5* 1.4   CALCIUM 9.7  --  9.0   , CMP   Recent Labs  Lab 03/04/17 1824 03/05/17 0505 03/06/17  0434     --  140   K 4.1  --  4.3     --  104   CO2 20*  --  25     --  147*   BUN 29*  --  23   CREATININE 1.7* 1.5* 1.4   CALCIUM 9.7  --  9.0   ANIONGAP 14  --  11   ESTGFRAFRICA 49* 56* >60   EGFRNONAA 42* 49* 53*   , CBC   Recent Labs  Lab 03/04/17 1824 03/06/17  0434   WBC 11.80 7.90   HGB 17.4 15.6   HCT 53.2 47.8    168    and Lipid Panel   Recent Labs  Lab 03/05/17  0505   CHOL 133   HDL 30*   LDLCALC 76.6   TRIG 132   CHOLHDL 22.6       Significant Imaging:   Echo 03/05/2017:  CONCLUSIONS     1 - Enlarged left ventricular enlargement.     2 - Eccentric hypertrophy.     3 - Moderately to severely depressed left ventricular systolic function (EF 25-30%).     4 - Multiple regional wall motion abnormalities consistent with prior apical infarction and multivessel disease..     5 - 1.5 cm sessile apical thrombus.     6 - Normal left ventricular diastolic function.     7 - Right ventricular enlargement with normal systolic function.     8 - The estimated PA systolic pressure is greater than 18 mmHg.     9 - Difficult apical windows, Optison contrast used for wall motion analysis    Bilateral Carotid US 03/05/2017:  No significant carotid stenosis.    Head CT 03/04/2017:  No acute intracranial process.    Small chronic right cerebellar infarct.

## 2017-03-07 ENCOUNTER — PATIENT OUTREACH (OUTPATIENT)
Dept: ADMINISTRATIVE | Facility: CLINIC | Age: 62
End: 2017-03-07
Payer: MEDICARE

## 2017-03-07 NOTE — PATIENT INSTRUCTIONS
C3 nurse attempted to contact patient. The following occurred:   C3 nurse attempted to contact Earnest Wallis for a TCC post hospital discharge follow up call. The patient is unable to conduct the call @ this time. The patient requested a callback.    The patient has a scheduled hospitals appointment with [facility] on 03/07/17 @ 0800hrs.[facility], C3 nurse will continue efforts to contact patient.    Left-Sided Congestive Heart Failure    The heart is a large muscle that pumps blood throughout the body. Blood carries oxygen to all the organs (including the brain), muscles, and skin of your body. After the body takes the oxygen out of the blood, the blood returns to the heart. The right side of the heart collects that blood and pumps it to the lungs to receive fresh oxygen. This oxygen-rich blood from the lungs then returns to the left side of the heart, where it is pumped back out to the brain and rest of the body, starting the process all over.   Heart failure occurs when the heart muscle is weakened. This can occur after a heart attack or with significant coronary artery disease. This affects the pumping action of the heart.   When the left side of the heart is weakened, it cant handle the blood it is getting from the lungs. Pressure then builds up in the veins of the lungs, causing fluid to leak into the lung tissues. This may be referred to as congestive heart failure. This causes you to feel short of breath, weak, or dizzy. These symptoms are often worse with exertion, such as climbing stairs or walking up hills. Lying flat is uncomfortable and can make your breathing worse. This may make sleeping difficult and force you to use extra pillows to elevate your upper body to help you sleep well.  Causes of heart failure include:  · Coronary artery disease  · Heart attack in the past (also known as acute myocardial infarction, or AMI)  · High blood pressure  · Damaged heart  valve  · Diabetes  · Obesity  · Cigarette smoking  · Alcohol abuse  Treatment  Heart failure is a chronic condition. There is no cure. The purpose of medical treatment is to improve the pumping action of the heart, and remove excess water and fluids from the body. A number of medicines can help reach this goal, improve symptoms and keep the heart from becoming weaker. In some cases of severe heart failure, a mechanical device will be placed in the heart to help the heart pump. Another major goal is to better treat the causes of heart failure, such as diabetes, high blood pressure, and your lifestyle.  Home care  · Check your weight every day. A sudden increase in weight gain could mean worsening heart failure.  ¨ Use the same scale every day.  ¨ Weigh yourself at the same time every day.  ¨ Make sure the scale is on the floor, not on a rug.  ¨ Keep a record of your weight every day, so your healthcare provider can see it. If you are not given a log sheet for this, keep a separate journal for this purpose.   · Cut back on how much salt (sodium) you eat:  ¨ Your provider will tell you how much salt to have daily, usually 2,000 mg or less.  ¨ Avoid high-salt foods. These include olives, pickles, smoked meats, processed foods, and salted potato chips.  ¨ Don't add salt to your food at the table. Use only small amounts of salt when cooking.  ¨ Avoid binging on salt-heavy meals.  · Follow your healthcare provider's recommendations about how much fluid you should have.  · Stop smoking.  · Cut back on the amount of alcohol you drink.  · Lose weight if you are overweight. The excess weight adds a lot of stress on the workload of the heart.  · Stay active. Talk to your provider about an exercise program that is safe for your heart.  · Keep your feet elevated to reduce swelling. Ask your provider about support hose as a preventive treatment for daytime leg swelling.  · Follow your healthcare provider's instructions  closely.  Besides taking your medicine as instructed, an important part of treatment includes lifestyle changes. These include diet, physical activity, stopping smoking, and weight control.  Improve your diet. Often in the hospital, people are given a heart healthy diet. This includes more fresh foods, lower saturated fat, less processed foods, and lower salt.  Follow-up care  Follow up with your healthcare provider, or as advised. Make sure to keep any appointments that were made for you. This can help better control heart failure.  If an X-ray was done, you will be told of any new findings that may affect your care.  Call 911  Call 911 if you:  · Become severely short of breath  · Feel lightheaded, or feel like you might pass out or faint  · Have chest pain or discomfort that is different than usual, the medicines your provider told you to use for this don't help, or the pain lasts longer than 10 to 15 minutes  · Develop a rapid heart rate suddenly  When to seek medical advice  Call your healthcare provider right away if you have any of these signs of worsening heart failure:  · Sudden weight gain --  more than 2 pounds in 1 day, or 5 pounds in 1 week, or whatever weight gain you were told to report by your provider  · Trouble breathing not related to being active  · New or increased swelling of your legs or ankles  · Swelling or pain in your abdomen  · Breathing trouble at night, waking up short of breath or needing more pillows to elevate your upper body to help you breathe  · Frequent coughing that doesnt go away  · Feeling much more tired than usual  Date Last Reviewed: 1/4/2016  © 1785-7586 [employer]. [address] [place] [address], [place] [address]. All rights reserved. This information is not intended as a substitute for professional medical care. Always follow your healthcare professional's instructions.

## 2017-03-08 ENCOUNTER — NURSE TRIAGE (OUTPATIENT)
Dept: ADMINISTRATIVE | Facility: CLINIC | Age: 62
End: 2017-03-08

## 2017-03-08 NOTE — PROGRESS NOTES
Please forward this important TCC information to your provider in order to maximize the post discharge care delivery of this patient.    C3 nurse spoke with Earnest Pradhantej for a TCC post hospital discharge follow up call.   He was prescribed Apixaban 5mg (2) BID for 10 days, then 5mg daily till discontinued. Patient had an appointment with [facility] 3/7/17 was told he did not need this medication. Patient states unable to afford medication. Message sent to Pharmacy Assist Program. Please advise.    Respectfully,  Coty De La Cruz RN, BSN, CCRN  [facility]    [email or web address]       Please do not reply to this message, as this inbox is not routinely monitored.

## 2017-03-08 NOTE — TELEPHONE ENCOUNTER
Reason for Disposition   [1] Follow-up call to recent contact AND [2] information only call, no triage required    Additional Information   Commented on: Answer Assessment - Initial Assessment Questions     Patient is calling back for TCC.  Connected caller to TCC nurse Coty De La Cruz.    Protocols used: ST INFORMATION ONLY CALL-A-AH

## 2017-03-09 ENCOUNTER — TELEPHONE (OUTPATIENT)
Dept: PHARMACY | Facility: CLINIC | Age: 62
End: 2017-03-09

## 2017-03-13 ENCOUNTER — HOSPITAL ENCOUNTER (OUTPATIENT)
Facility: HOSPITAL | Age: 62
Discharge: HOME HEALTH | End: 2017-03-14
Attending: EMERGENCY MEDICINE | Admitting: FAMILY MEDICINE
Payer: MEDICARE

## 2017-03-13 ENCOUNTER — NURSE TRIAGE (OUTPATIENT)
Dept: ADMINISTRATIVE | Facility: CLINIC | Age: 62
End: 2017-03-13

## 2017-03-13 DIAGNOSIS — G45.9 TIA DUE TO EMBOLISM: ICD-10-CM

## 2017-03-13 DIAGNOSIS — Z91.148 NONCOMPLIANCE WITH MEDICATION REGIMEN: Primary | ICD-10-CM

## 2017-03-13 DIAGNOSIS — I74.9 TIA DUE TO EMBOLISM: ICD-10-CM

## 2017-03-13 PROBLEM — N18.30 CKD (CHRONIC KIDNEY DISEASE) STAGE 3, GFR 30-59 ML/MIN: Status: ACTIVE | Noted: 2017-03-13

## 2017-03-13 PROBLEM — I10 ESSENTIAL HYPERTENSION: Status: ACTIVE | Noted: 2017-03-13

## 2017-03-13 PROBLEM — N17.9 AKI (ACUTE KIDNEY INJURY): Status: RESOLVED | Noted: 2017-03-05 | Resolved: 2017-03-13

## 2017-03-13 LAB
ALBUMIN SERPL BCP-MCNC: 3.8 G/DL
ALP SERPL-CCNC: 78 U/L
ALT SERPL W/O P-5'-P-CCNC: 54 U/L
ANION GAP SERPL CALC-SCNC: 11 MMOL/L
APTT BLDCRRT: 27 SEC
AST SERPL-CCNC: 22 U/L
BASOPHILS # BLD AUTO: 0.1 K/UL
BASOPHILS NFR BLD: 0.7 %
BILIRUB SERPL-MCNC: 0.7 MG/DL
BUN SERPL-MCNC: 19 MG/DL
CALCIUM SERPL-MCNC: 9.4 MG/DL
CHLORIDE SERPL-SCNC: 104 MMOL/L
CO2 SERPL-SCNC: 24 MMOL/L
CREAT SERPL-MCNC: 1.4 MG/DL
DIFFERENTIAL METHOD: ABNORMAL
EOSINOPHIL # BLD AUTO: 0.3 K/UL
EOSINOPHIL NFR BLD: 3.1 %
ERYTHROCYTE [DISTWIDTH] IN BLOOD BY AUTOMATED COUNT: 14.8 %
EST. GFR  (AFRICAN AMERICAN): >60 ML/MIN/1.73 M^2
EST. GFR  (NON AFRICAN AMERICAN): 53 ML/MIN/1.73 M^2
ESTIMATED AVG GLUCOSE: 197 MG/DL
GLUCOSE SERPL-MCNC: 135 MG/DL
HBA1C MFR BLD HPLC: 8.5 %
HCT VFR BLD AUTO: 50.5 %
HGB BLD-MCNC: 16.6 G/DL
INR PPP: 1
LYMPHOCYTES # BLD AUTO: 3.3 K/UL
LYMPHOCYTES NFR BLD: 33.8 %
MCH RBC QN AUTO: 29.8 PG
MCHC RBC AUTO-ENTMCNC: 33 %
MCV RBC AUTO: 90 FL
MONOCYTES # BLD AUTO: 1 K/UL
MONOCYTES NFR BLD: 10 %
NEUTROPHILS # BLD AUTO: 5.2 K/UL
NEUTROPHILS NFR BLD: 52.4 %
PLATELET # BLD AUTO: 191 K/UL
PMV BLD AUTO: 9.4 FL
POCT GLUCOSE: 152 MG/DL (ref 70–110)
POCT GLUCOSE: 158 MG/DL (ref 70–110)
POCT GLUCOSE: 190 MG/DL (ref 70–110)
POTASSIUM SERPL-SCNC: 4.1 MMOL/L
PROT SERPL-MCNC: 7.3 G/DL
PROTHROMBIN TIME: 10.3 SEC
RBC # BLD AUTO: 5.59 M/UL
SODIUM SERPL-SCNC: 139 MMOL/L
WBC # BLD AUTO: 9.9 K/UL

## 2017-03-13 PROCEDURE — 85025 COMPLETE CBC W/AUTO DIFF WBC: CPT

## 2017-03-13 PROCEDURE — 80053 COMPREHEN METABOLIC PANEL: CPT

## 2017-03-13 PROCEDURE — 96372 THER/PROPH/DIAG INJ SC/IM: CPT

## 2017-03-13 PROCEDURE — 25000003 PHARM REV CODE 250: Performed by: NURSE PRACTITIONER

## 2017-03-13 PROCEDURE — 99285 EMERGENCY DEPT VISIT HI MDM: CPT | Mod: 25

## 2017-03-13 PROCEDURE — 36415 COLL VENOUS BLD VENIPUNCTURE: CPT

## 2017-03-13 PROCEDURE — 85730 THROMBOPLASTIN TIME PARTIAL: CPT

## 2017-03-13 PROCEDURE — G0378 HOSPITAL OBSERVATION PER HR: HCPCS

## 2017-03-13 PROCEDURE — 63600175 PHARM REV CODE 636 W HCPCS: Performed by: EMERGENCY MEDICINE

## 2017-03-13 PROCEDURE — 63600175 PHARM REV CODE 636 W HCPCS: Performed by: NURSE PRACTITIONER

## 2017-03-13 PROCEDURE — 85610 PROTHROMBIN TIME: CPT

## 2017-03-13 PROCEDURE — 99204 OFFICE O/P NEW MOD 45 MIN: CPT | Mod: ,,, | Performed by: PSYCHIATRY & NEUROLOGY

## 2017-03-13 PROCEDURE — 83036 HEMOGLOBIN GLYCOSYLATED A1C: CPT

## 2017-03-13 RX ORDER — WARFARIN SODIUM 5 MG/1
5 TABLET ORAL DAILY
Status: DISCONTINUED | OUTPATIENT
Start: 2017-03-13 | End: 2017-03-14 | Stop reason: HOSPADM

## 2017-03-13 RX ORDER — SODIUM CHLORIDE 9 MG/ML
INJECTION, SOLUTION INTRAVENOUS CONTINUOUS
Status: DISCONTINUED | OUTPATIENT
Start: 2017-03-13 | End: 2017-03-14 | Stop reason: HOSPADM

## 2017-03-13 RX ORDER — INSULIN ASPART 100 [IU]/ML
0-5 INJECTION, SOLUTION INTRAVENOUS; SUBCUTANEOUS
Status: DISCONTINUED | OUTPATIENT
Start: 2017-03-13 | End: 2017-03-14 | Stop reason: HOSPADM

## 2017-03-13 RX ORDER — IBUPROFEN 200 MG
24 TABLET ORAL
Status: DISCONTINUED | OUTPATIENT
Start: 2017-03-13 | End: 2017-03-14 | Stop reason: HOSPADM

## 2017-03-13 RX ORDER — GLUCAGON 1 MG
1 KIT INJECTION
Status: DISCONTINUED | OUTPATIENT
Start: 2017-03-13 | End: 2017-03-14 | Stop reason: HOSPADM

## 2017-03-13 RX ORDER — LISINOPRIL 2.5 MG/1
5 TABLET ORAL DAILY
Status: DISCONTINUED | OUTPATIENT
Start: 2017-03-13 | End: 2017-03-14 | Stop reason: HOSPADM

## 2017-03-13 RX ORDER — ASPIRIN 81 MG/1
81 TABLET ORAL DAILY
Status: DISCONTINUED | OUTPATIENT
Start: 2017-03-13 | End: 2017-03-14 | Stop reason: HOSPADM

## 2017-03-13 RX ORDER — POTASSIUM CHLORIDE 20 MEQ/1
20 TABLET, EXTENDED RELEASE ORAL DAILY
Status: DISCONTINUED | OUTPATIENT
Start: 2017-03-13 | End: 2017-03-14 | Stop reason: HOSPADM

## 2017-03-13 RX ORDER — ENOXAPARIN SODIUM 150 MG/ML
1 INJECTION SUBCUTANEOUS
Status: DISCONTINUED | OUTPATIENT
Start: 2017-03-13 | End: 2017-03-14 | Stop reason: HOSPADM

## 2017-03-13 RX ORDER — ENOXAPARIN SODIUM 100 MG/ML
100 INJECTION SUBCUTANEOUS
Status: COMPLETED | OUTPATIENT
Start: 2017-03-13 | End: 2017-03-13

## 2017-03-13 RX ORDER — INSULIN ASPART 100 [IU]/ML
30 INJECTION, SUSPENSION SUBCUTANEOUS
Status: DISCONTINUED | OUTPATIENT
Start: 2017-03-13 | End: 2017-03-14 | Stop reason: HOSPADM

## 2017-03-13 RX ORDER — IBUPROFEN 200 MG
16 TABLET ORAL
Status: DISCONTINUED | OUTPATIENT
Start: 2017-03-13 | End: 2017-03-14 | Stop reason: HOSPADM

## 2017-03-13 RX ORDER — ATORVASTATIN CALCIUM 40 MG/1
40 TABLET, FILM COATED ORAL DAILY
Status: DISCONTINUED | OUTPATIENT
Start: 2017-03-13 | End: 2017-03-14 | Stop reason: HOSPADM

## 2017-03-13 RX ORDER — CARVEDILOL 6.25 MG/1
12.5 TABLET ORAL 2 TIMES DAILY WITH MEALS
Status: DISCONTINUED | OUTPATIENT
Start: 2017-03-13 | End: 2017-03-14 | Stop reason: HOSPADM

## 2017-03-13 RX ADMIN — CARVEDILOL 12.5 MG: 6.25 TABLET, FILM COATED ORAL at 05:03

## 2017-03-13 RX ADMIN — ATORVASTATIN CALCIUM 40 MG: 40 TABLET, FILM COATED ORAL at 08:03

## 2017-03-13 RX ADMIN — CARVEDILOL 12.5 MG: 6.25 TABLET, FILM COATED ORAL at 08:03

## 2017-03-13 RX ADMIN — LISINOPRIL 5 MG: 2.5 TABLET ORAL at 08:03

## 2017-03-13 RX ADMIN — ENOXAPARIN SODIUM 120 MG: 150 INJECTION SUBCUTANEOUS at 09:03

## 2017-03-13 RX ADMIN — ASPIRIN 81 MG: 81 TABLET, COATED ORAL at 08:03

## 2017-03-13 RX ADMIN — INSULIN ASPART 30 UNITS: 100 INJECTION, SUSPENSION SUBCUTANEOUS at 08:03

## 2017-03-13 RX ADMIN — WARFARIN SODIUM 5 MG: 5 TABLET ORAL at 05:03

## 2017-03-13 RX ADMIN — ENOXAPARIN SODIUM 100 MG: 100 INJECTION SUBCUTANEOUS at 02:03

## 2017-03-13 RX ADMIN — POTASSIUM CHLORIDE 20 MEQ: 20 TABLET, EXTENDED RELEASE ORAL at 08:03

## 2017-03-13 RX ADMIN — SODIUM CHLORIDE: 0.9 INJECTION, SOLUTION INTRAVENOUS at 06:03

## 2017-03-13 RX ADMIN — FUROSEMIDE 60 MG: 20 TABLET ORAL at 08:03

## 2017-03-13 NOTE — SUBJECTIVE & OBJECTIVE
Past Medical History:   Diagnosis Date    Cancer     skin cancer left shoulder    Diabetes mellitus     Hypertension     MI, old 2014       Past Surgical History:   Procedure Laterality Date    CARDIAC PACEMAKER PLACEMENT  2014    CARDIAC PACEMAKER PLACEMENT      EYE SURGERY Bilateral     laser corrective surgery    SHOULDER SURGERY Left     TONSILLECTOMY         Review of patient's allergies indicates:  No Known Allergies    No current facility-administered medications on file prior to encounter.      Current Outpatient Prescriptions on File Prior to Encounter   Medication Sig    apixaban 5 mg Tab Take 2 tablets (10 mg total) by mouth 2 (two) times daily. 10 mg twice daily x 10 days then 5 mg twice daily.    aspirin (ECOTRIN) 81 MG EC tablet Take 1 tablet (81 mg total) by mouth once daily.    atorvastatin (LIPITOR) 40 MG tablet Take 1 tablet (40 mg total) by mouth once daily.    carvedilol (COREG) 25 MG tablet Take 12.5 mg by mouth 2 (two) times daily with meals.    furosemide (LASIX) 40 MG tablet Take 1 tablet (40 mg total) by mouth daily as needed.    insulin NPH-insulin regular, 70/30, (NOVOLIN 70/30) 100 unit/mL (70-30) injection Inject 30 Units into the skin once daily.    lisinopril (PRINIVIL,ZESTRIL) 5 MG tablet Take 1 tablet (5 mg total) by mouth once daily.    metformin (GLUCOPHAGE) 1000 MG tablet Take 1,000 mg by mouth 2 (two) times daily with meals.    potassium chloride SA (K-DUR,KLOR-CON) 20 MEQ tablet Take 20 mEq by mouth once daily.     Family History     None        Social History Main Topics    Smoking status: Current Every Day Smoker     Packs/day: 0.50     Types: Cigarettes    Smokeless tobacco: Not on file    Alcohol use No    Drug use: No    Sexual activity: Not on file     Review of Systems   Constitutional: Negative for appetite change and fever.   HENT: Negative for congestion.    Eyes: Negative for visual disturbance.   Respiratory: Negative for shortness of breath.     Cardiovascular: Negative for chest pain and leg swelling.   Gastrointestinal: Negative for abdominal pain, diarrhea, nausea and vomiting.   Genitourinary: Negative for dysuria.   Musculoskeletal: Negative for arthralgias.   Skin: Negative for wound.   Neurological: Positive for numbness.        Right facial and hand tingling/numbness   Hematological: Does not bruise/bleed easily.   Psychiatric/Behavioral: Negative for confusion and hallucinations.     Objective:     Vital Signs (Most Recent):  Temp: 97.9 °F (36.6 °C) (03/13/17 0157)  Pulse: 71 (03/13/17 0157)  Resp: 20 (03/13/17 0157)  BP: (!) 131/92 (03/13/17 0157)  SpO2: 98 % (03/13/17 0157) Vital Signs (24h Range):  Temp:  [97.9 °F (36.6 °C)] 97.9 °F (36.6 °C)  Pulse:  [71] 71  Resp:  [20] 20  SpO2:  [98 %] 98 %  BP: (131)/(92) 131/92     Weight: 113.4 kg (250 lb)  Body mass index is 32.1 kg/(m^2).    Physical Exam   Constitutional: He is oriented to person, place, and time. He appears well-developed and well-nourished. No distress.   HENT:   Head: Normocephalic and atraumatic.   Eyes: Conjunctivae are normal. Pupils are equal, round, and reactive to light.   Neck: Normal range of motion. Neck supple. No JVD present. No tracheal deviation present.   Cardiovascular: Normal rate, regular rhythm, normal heart sounds and intact distal pulses.    Pulmonary/Chest: Effort normal and breath sounds normal. No respiratory distress.   Abdominal: Soft. Bowel sounds are normal. He exhibits no distension. There is no tenderness.   Musculoskeletal: Normal range of motion. He exhibits no edema.   Neurological: He is alert and oriented to person, place, and time. No cranial nerve deficit.   Skin: Skin is warm, dry and intact.   Psychiatric: He has a normal mood and affect. His behavior is normal. Judgment and thought content normal.        Significant Labs:   CBC:   Recent Labs  Lab 03/13/17  0234   WBC 9.90   HGB 16.6   HCT 50.5        CMP:   Recent Labs  Lab  03/13/17  0234      K 4.1      CO2 24   *   BUN 19   CREATININE 1.4   CALCIUM 9.4   PROT 7.3   ALBUMIN 3.8   BILITOT 0.7   ALKPHOS 78   AST 22   ALT 54*   ANIONGAP 11   EGFRNONAA 53*     Coagulation:   Recent Labs  Lab 03/13/17  0234   INR 1.0   APTT 27.0       Significant Imaging: None Today    CT Head 3/4/17:  Impression        No acute intracranial process.   Small chronic right cerebellar infarct.               Carotid u/s 3/5/17:   Impression     No significant carotid stenosis.         Echocardiogram 3/5/17:   CONCLUSIONS     1 - Enlarged left ventricular enlargement.     2 - Eccentric hypertrophy.     3 - Moderately to severely depressed left ventricular systolic function (EF 25-30%).     4 - Multiple regional wall motion abnormalities consistent with prior apical infarction and multivessel disease..     5 - 1.5 cm sessile apical thrombus.     6 - Normal left ventricular diastolic function.     7 - Right ventricular enlargement with normal systolic function.     8 - The estimated PA systolic pressure is greater than 18 mmHg.     9 - Difficult apical windows, Optison contrast used for wall motion analysis.

## 2017-03-13 NOTE — CONSULTS
Educated patient on vitamin K/coumadin interaction. Provided nutrition packet with list of foods rich in vitamin K.   See education tab. Provided email should questions arise.

## 2017-03-13 NOTE — UM SECONDARY REVIEW
Physician Advisor External    Level of Care Issue    Approved Observation/Outpt for admit 3/13/17 per ehr review, dr cope.

## 2017-03-13 NOTE — TELEPHONE ENCOUNTER
Reason for Disposition   [1] Weakness (i.e., paralysis, loss of muscle strength) of the face, arm / hand, or leg / foot on one side of the body AND [2] sudden onset AND [3] present now    Protocols used: ST NEUROLOGIC DEFICIT-A-AH

## 2017-03-13 NOTE — ASSESSMENT & PLAN NOTE
Chronic/Controlled.  Monitor blood sugars and SSI  Hold home oral DM medications while in the hospital.

## 2017-03-13 NOTE — PLAN OF CARE
Patient was recently discharged home after a TIA on 03/06/17.  Patient was prescribed eliquis on discharge however [facility] is unable to [facility] him until May; patient [facility].  Patient is now willing to use his medicare benefits and obtain an [facility] PCP; will schedule a new patient at the [facility] per pt's request.    New patient appt scheduled at [facility] for Friday 03/17; placed on AVS.       03/13/17 1207   Discharge Assessment   Assessment Type Discharge Planning Assessment   Confirmed/corrected address and phone number on facesheet? Yes   Assessment information obtained from? Patient   Prior to hospitilization cognitive status: Alert/Oriented   Prior to hospitalization functional status: Independent   Current cognitive status: Alert/Oriented   Current Functional Status: Independent   Arrived From home or self-care   Lives With alone   Able to Return to Prior Arrangements yes   Is patient able to care for self after discharge? Yes   How many people do you have in your home that can help with your care after discharge? 0   Patient's perception of discharge disposition home or selfcare   Readmission Within The Last 30 Days previous discharge plan unsuccessful   Patient currently being followed by outpatient case management? No   Patient currently receives home health services? No   Does the patient currently use HME? No   Patient currently receives private duty nursing? No   Patient currently receives any other outside agency services? No   Equipment Currently Used at Home none   Do you have any problems affording any of your prescribed medications? ([facility])   Is the patient taking medications as prescribed? no  (was unable to obtain el[facility]is [facility] the [facility] but is taking other med's as prescribed)   Do you have any financial concerns preventing you from receiving the healthcare  you need? No   Does the patient have transportation to healthcare appointments? Yes   Transportation Available car;family or friend will provide   On Dialysis? No   Does the patient receive services at the [facility]madin Clinic? No   Discharge Plan A Home   Patient/Family In Agreement With Plan yes

## 2017-03-13 NOTE — PROGRESS NOTES
Neuro Note    I just reviewed brain CT: No CVA.    A: TIAs  P: I agree with Coumadin.  Signing off.  D/W RN.  Thanks. Truong Alfonso MD

## 2017-03-13 NOTE — IP AVS SNAPSHOT
[address] [place] [address]  Phone: [phone]           Patient Discharge Instructions     Our goal is to set you up for success. This packet includes information on your condition, medications, and your home care. It will help you to care for yourself so you don't get sicker and need to go back to the hospital.     Please ask your nurse if you have any questions.        There are many details to remember when preparing to leave the hospital. Here is what you will need to do:    1. Take your medicine. If you are prescribed medications, review your Medication List in the following pages. You may have new medications to  at the pharmacy and others that you'll need to stop taking. Review the instructions for how and when to take your medications. Talk with your doctor or nurses if you are unsure of what to do.     2. Go to your follow-up appointments. Specific follow-up information is listed in the following pages. Your may be contacted by a transition nurse or clinical provider about future appointments. Be sure we have all of the phone numbers to reach you, if needed. Please contact your provider's office if you are unable to make an appointment.     3. Watch for warning signs. Your doctor or nurse will give you detailed warning signs to watch for and when to call for assistance. These instructions may also include educational information about your condition. If you experience any of warning signs to your health, call your doctor.               [facility]  Unless otherwise directed by your provider, please contact [facility], our nurse care line that is available for 24/7 assistance.     [phone] (toll-free)    Registered nurses in the [facility] provide clinical advisement, health education, appointment booking, and other advisory services.                    ** Verify the list of medication(s) below is accurate and up to date.  Carry this with you in case of emergency. If your medications have changed, please notify your healthcare provider.             Medication List      START taking these medications        Additional Info    Begin Date AM Noon PM Bedtime    enoxaparin 120 mg/0.8 mL Syrg   Commonly known as:  LOVENOX   Quantity:  6.4 mL   Refills:  0   Dose:  1 mg/kg    Last time this was given:  120 mg on 3/14/2017 10:01 AM   Instructions:  Inject 0.8 mLs (120 mg total) into the skin every 12 (twelve) hours.                                  warfarin 5 MG tablet   Commonly known as:  COUMADIN   Quantity:  30 tablet   Refills:  0   Dose:  5 mg    Last time this was given:  5 mg on 3/13/2017  5:26 PM   Instructions:  Take 1 tablet (5 mg total) by mouth Daily.                                 CONTINUE taking these medications        Additional Info    Begin Date AM Noon PM Bedtime    atorvastatin 40 MG tablet   Commonly known as:  LIPITOR   Quantity:  30 tablet   Refills:  1   Dose:  40 mg    Last time this was given:  40 mg on 3/14/2017 10:00 AM   Instructions:  Take 1 tablet (40 mg total) by mouth once daily.                               carvedilol 25 MG tablet   Commonly known as:  COREG   Refills:  0   Dose:  12.5 mg    Last time this was given:  12.5 mg on 3/14/2017 10:00 AM   Instructions:  Take 12.5 mg by mouth 2 (two) times daily with meals.                                  furosemide 40 MG tablet   Commonly known as:  LASIX   Quantity:  30 tablet   Refills:  0   Dose:  40 mg    Last time this was given:  60 mg on 3/14/2017 10:00 AM   Instructions:  Take 1 tablet (40 mg total) by mouth daily as needed.                               insulin NPH-insulin regular (70/30) 100 unit/mL (70-30) injection   Commonly known as:  NOVOLIN 70/30   Refills:  0   Dose:  30 Units    Instructions:  Inject 30 Units into the skin once daily.                               lisinopril 5 MG tablet   Commonly known as:  PRINIVIL,ZESTRIL   Quantity:  90  tablet   Refills:  1   Dose:  5 mg    Last time this was given:  5 mg on 3/14/2017 10:00 AM   Instructions:  Take 1 tablet (5 mg total) by mouth once daily.                               metformin 1000 MG tablet   Commonly known as:  GLUCOPHAGE   Refills:  0   Dose:  1000 mg    Instructions:  Take 1,000 mg by mouth 2 (two) times daily with meals.                                  potassium chloride SA 20 MEQ tablet   Commonly known as:  K-DUR,KLOR-CON   Refills:  0   Dose:  20 mEq    Last time this was given:  20 mEq on 3/14/2017 10:00 AM   Instructions:  Take 20 mEq by mouth once daily.                                 STOP taking these medications     apixaban 5 mg Tab       aspirin 81 MG EC tablet   Commonly known as:  ECOTRIN            Where to Get Your Medications      These medications were sent to [facility] [address] [place] [id number] [email or web address]  [address] [facility] [place] [address]     Phone:  [phone]     enoxaparin 120 mg/0.8 mL Syrg    warfarin 5 MG tablet                  Please bring to all follow up appointments:    1. A copy of your discharge instructions.  2. All medicines you are currently taking in their original bottles.  3. Identification and insurance card.    Please arrive 15 minutes ahead of scheduled appointment time.    Please call 24 hours in advance if you must reschedule your appointment and/or time.        Your Scheduled Appointments     Mar 17, 2017  2:40 PM CDT   New Patient with COLIN Puckett   [facility]    [address] [place] [id number] [phone]            May 22, 2017  1:00 PM CDT   New Patient with MD [facility] Cardiology ([place] BEAVER)    [address] [facility] [address]  [facility] [address] [phone]              Follow-up Information     Follow up with Vipul Fishman MD On 3/17/2017.    Specialties:  Family Medicine, Internal Medicine    Contact information:    [address] [place] [address]  "[place] [address]  [phone]        Referrals     Future Orders    Referral to Home health         Discharge Instructions     Future Orders    Diet Diabetic 2000 Calories         Primary Diagnosis     Your primary diagnosis was:  Tia Due To Embolism      Admission Information     Date & Time Provider Department CSN    3/13/2017  1:54 AM Cadence Rodriges MD [facility] [id number]      Care Providers     Provider Role Specialty Primary office phone    Cadence Rodriges MD Attending Provider Family Medicine [phone]    Truong Alfonso MD Consulting Physician  Neurology [phone]      Your Vitals Were     BP Pulse Temp Resp Height Weight    115/82 61 97.2 °F (36.2 °C) (Oral) 20 6' 2" (1.88 m) 113.4 kg (250 lb)    SpO2 BMI             95% 32.1 kg/m2         Recent Lab Values        3/13/2017                           2:34 AM           A1C 8.5 (H)           Comment for A1C at  2:34 AM on 3/13/2017:  According to ADA guidelines, hemoglobin A1C <7.0% represents  optimal control in non-pregnant diabetic patients.  Different  metrics may apply to specific populations.   Standards of Medical Care in Diabetes - 2016.  For the purpose of screening for the presence of diabetes:  <5.7%     Consistent with the absence of diabetes  5.7-6.4%  Consistent with increasing risk for diabetes   (prediabetes)  >or=6.5%  Consistent with diabetes  Currently no consensus exists for use of hemoglobin A1C  for diagnosis of diabetes for children.        Allergies as of 3/14/2017     No Known Allergies      Advance Directives     An advance directive is a document which, in the event you are no longer able to make decisions for yourself, tells your healthcare team what kind of treatment you do or do not want to receive, or who you would like to make those decisions for you.  If you do not currently have an advance directive, [facility] encourages you to create one.  For more information call:  [phone]), " [phone] ([phone]),  or log on to [email or web address].        Language Assistance Services     ATTENTION: Language assistance services are available, free of charge. Please call [phone].      ATENCIÓN: Si sanya candelaria, tiene a maya disposición servicios gratuitos de asistencia lingüística. Llame al [phone].     CHÚ Ý: N?u b?n nói Ti?ng Vi?t, có các d?ch v? h? tr? ngôn ng? mi?n phí dành cho b?n. G?i s? [phone].        Heart Failure Education       Heart Failure: Being Active  You have a condition called heart failure. Being active doesnt mean that you have to wear yourself out. Even a little movement each day helps to strengthen your heart. If you cant get out to exercise, you can do simple stretching and strengthening exercises at home. These are good ways to keep you well-conditioned and prevent you and your heart from becoming excessively weak.    Ideas to get you started  · Add a little movement to things you do now. Walk to mail letters. Park your car at the far end of the parking lot and walk to the store. Walk up a flight of stairs instead of taking the elevator.  · Choose activities you enjoy. You might walk, swim, or ride an exercise bike. Things like gardening and washing the car count, too. Other possibilities include: washing dishes, walking the dog, walking around the mall, and doing aerobic activities with friends.  · Join a group exercise program at a [facility] or [facility], a senior center, or a community center. Or look into a hospital cardiac rehabilitation program. Ask your doctor if you qualify.  Tips to keep you going  · Get up and get dressed each day. Go to a coffee shop and read a newspaper or go somewhere that you'll be in the presence of other active people. Youll feel more like being active.  · Make a plan. Choose one or more activities that you enjoy and that you can easily do. Then plan to do at least one each day. You might write your plan on a  calendar.  · Go with a friend or a group if you like company. This can help you feel supported and stay motivated, too.  · Plan social events that you enjoy. This will keep you mentally engaged as well as physically motivated to do things you find pleasure in.  For your safety  · Talk with your healthcare provider before starting an exercise program.  · Exercise indoors when its too hot or too cold outside, or when the air quality is poor. Try walking at a shopping mall.  · Wear socks and sturdy shoes to maintain your balance and prevent falls.  · Start slowly. Do a few minutes several times a day at first. Increase your time and speed little by little.  · Stop and rest whenever you feel tired or get short of breath.  · Dont push yourself on days when you dont feel well.  Date Last Reviewed: 3/20/2016  © 2512-8775 [employer]. [address] [place] [address]. All rights reserved. This information is not intended as a substitute for professional medical care. Always follow your healthcare professional's instructions.              Heart Failure: Evaluating Your Heart  You have a condition called heart failure. To evaluate your condition, your doctor will examine you, ask questions, and do some tests. Along with looking for signs of heart failure, the doctor looks for any other health problems that may have led to heart failure. The results of your evaluation will help your doctor form a treatment plan.  Health history and physical exam  Your visit will start with a health history. Tell the doctor about any symptoms youve noticed and about all medicines you take. Then youll have a physical exam. This includes listening to your heartbeat and breathing. Youll also be checked for swelling (edema) in your legs and neck. When you have fluid buildup or fluid in the lungs, it may be called congestive heart failure.  Diagnosing heart failure     During an echocardiogram, sound waves bounce off the  heart. These are converted into a picture on the screen.   The following may be done to help your doctor form a diagnosis:  · X-rays show the size and shape of your heart. These pictures can also show fluid in your lungs.  · An electrocardiogram (ECG or EKG) shows the pattern of your heartbeat. Small pads (electrodes) are placed on your chest, arms, and legs. Wires connect the pads to the ECG machine, which records your hearts electrical signals. This can give the doctor information about heart function.  · An echocardiogram uses ultrasound waves to show the structure and movement of your heart muscle. This shows how well the heart pumps. It also shows the thickness of the heart walls, and if the heart is enlarged. It is one of the most useful, non-invasive tests as it provides information about the heart's general function. This helps your doctor make treatment decisions.  · Lab tests evaluate small amounts of blood or urine for signs of problems. A BNP lab test can help diagnose and evaluate heart failure. BNP stands for B-type natriuretic peptide. The ventricles secrete more BNP when heart failure worsens. Lab tests can also provide information about metabolic dysfunction or heart dysfunction.  Your treatment plan  Based on the results of your evaluation and tests, your doctor will develop a treatment plan. This plan is designed to relieve some of your heart failure symptoms and help make you more comfortable. Your treatment plan may include:  · Medicine to help your heart work better and improve your quality of life  · Changes in what you eat and drink to help prevent fluid from backing up in your body  · Daily monitoring of your weight and heart failure symptoms to see how well your treatment plan is working  · Exercise to help you stay healthy  · Help with quitting smoking  · Emotional and psychological support to help adjust to the changes  · Referrals to other specialists to make sure you are being treated  comprehensively  Date Last Reviewed: 3/21/2016  © 9737-1780 [employer]. [address] [place] [address], [place] [address]. All rights reserved. This information is not intended as a substitute for professional medical care. Always follow your healthcare professional's instructions.              Heart Failure: Making Changes to Your Diet  You have a condition called heart failure. When you have heart failure, excess fluid is more likely to build up in your body because your heart isn't working well. This makes the heart work harder to pump blood. Fluid buildup causes symptoms such as shortness of breath and swelling (edema). This is often referred to as congestive heart failure or CHF. Controlling the amount of salt (sodium) you eat may help stop fluid from building up. Your doctor may also tell you to reduce the amount of fluid you drink.  Reading food labels    Your healthcare provider will tell you how much sodium you can eat each day. Read food labels to keep track. Keep in mind that certain foods are high in salt. These include canned, frozen, and processed foods. Check the amount of sodium in each serving. Watch out for high-sodium ingredients. These include MSG (monosodium glutamate), baking soda, and sodium phosphate.   Eating less salt  Give yourself time to get used to eating less salt. It may take a little while. Here are some tips to help:  · Take the saltshaker off the table. Replace it with salt-free herb mixes and spices.  · Eat fresh or plain frozen vegetables. These have much less salt than canned vegetables.  · Choose low-sodium snacks like sodium-free pretzels, crackers, or air-popped popcorn.  · Dont add salt to your food when youre cooking. Instead, season your foods with pepper, lemon, garlic, or onion.  · When you eat out, ask that your food be cooked without added salt.  · Avoid eating fried foods as these often have a great deal of salt.  If youre told to limit fluids  You may need  to limit how much fluid you have to help prevent swelling. This includes anything that is liquid at room temperature, such as ice cream and soup. If your doctor tells you to limit fluid, try these tips:  · Measure drinks in a measuring cup before you drink them. This will help you meet daily goals.  · Chill drinks to make them more refreshing.  · Suck on frozen lemon wedges to quench thirst.  · Only drink when youre thirsty.  · Chew sugarless gum or suck on hard candy to keep your mouth moist.  · Weigh yourself daily to know if your body's fluid content is rising.  My sodium goal  Your healthcare provider may give you a sodium goal to meet each day. This includes sodium found in food as well as salt that you add. My goal is to eat no more than ___________ mg of sodium per day.     When to call your doctor  Call your doctor right away if you have any symptoms of worsening heart failure. These can include:  · Sudden weight gain  · Increased swelling of your legs or ankles  · Trouble breathing when youre resting or at night  · Increase in the number of pillows you have to sleep on  · Chest pain, pressure, discomfort, or pain in the jaw, neck, or back   Date Last Reviewed: 3/21/2016  © 3169-9238 [employer]. [address] [place] [address], [place] [address]. All rights reserved. This information is not intended as a substitute for professional medical care. Always follow your healthcare professional's instructions.              Heart Failure: Medicines to Help Your Heart    You have a condition called heart failure (also known as congestive heart failure, or CHF). Your doctor will likely prescribe medicines for heart failure and any underlying health problems you have. Most heart failure patients take one or more types of medicinen. Your healthcare provider will work to find the combination of medicines that works best for you.  Heart failure medicines  Here are the most common heart failure medicines:  · ACE  inhibitors lower blood pressure and decrease strain on the heart. This makes it easier for the heart to pump. Angiotensin receptor blockers have similar effects. These are prescribed for some patients instead of ACE inhibitors.  · Beta-blockers relieve stress on the heart. They also improve symptoms. They may also improve the heart's pumping action over time.  · Diuretics (also called water pills) help rid your body of excess water. This can help rid your body of swelling (edema). Having less fluid to pump means your heart doesnt have to work as hard. Some diuretics make your body lose a mineral called potassium. Your doctor will tell you if you need to take supplements or eat more foods high in potassium.  · Digoxin helps your heart pump with more strength. This helps your heart pump more blood with each beat. So, more oxygen-rich blood travels to the rest of the body.  · Aldosterone antagonists help alter hormones and decrease strain on the heart.  · Hydralazine and nitrates are two separate medicines used together to treat heart failure. They may come in one combination pill. They lower blood pressure and decrease how hard the heart has to pump.  Medicines for related conditions  Controlling other heart problems helps keep heart failure under control, too. Depending on other heart problems you have, medicines may be prescribed to:  · Lower blood pressure (antihypertensives).  · Lower cholesterol levels (statins).  · Prevent blood clots (anticoagulants or aspirin).  · Keep the heartbeat steady (antiarrhythmics).  Date Last Reviewed: 3/5/2016  © 7721-5301 [employer]. [address] [place] [address], [place] [address]. All rights reserved. This information is not intended as a substitute for professional medical care. Always follow your healthcare professional's instructions.              Heart Failure: Procedures That May Help    The heart is a muscle that pumps oxygen-rich blood to all parts of the  body. When you have heart failure, the heart is not able to pump as well as it should. Blood and fluid may back up into the lungs (congestive heart failure), and some parts of the body dont get enough oxygen-rich blood to work normally. These problems lead to the symptoms of heart failure.     Certain procedures may help the heart pump better in some cases of heart failure. Some procedures are done to treat health problems that may have caused the heart failure such as coronary artery disease or heart rhythm problems. For more serious heart failure, other options are available.  Treating artery and valve problems  If you have coronary artery disease or valve disease, procedures may be done to improve blood flow. This helps the heart pump better, which can improve heart failure symptoms. First, your doctor may do a cardiac catheterization to help detect clogged blood vessels or valve damage. During this procedure, a  thin tube (catheter) in inserted into a blood vessel and guided to the heart. There a dye is injected and a special type of X-ray (angiogram) is taken of the blood vessels. Procedures to open a blocked artery or fix damaged valves can also be done using catheterization.  · Angioplasty uses a balloon-tipped instrument at the end of the catheter. The balloon is inflated to widen the narrowed artery. In many cases, a stent is expanded to further support the narrowed artery. A stent is a metal mesh tube.  · Valve surgery repairs or replacement of faulty valves can also be done during catheterization so blood can flow properly through the chambers of the heart.  Bypass surgery is another option to help treat blocked arteries. It uses a healthy blood vessel from elsewhere in the body. The healthy blood vessel is attached above and below the blocked area so that blood can flow around the blocked artery.  Treating heart rhythm problems  A device may be placed in the chest to help a weak heart maintain a  healthy, heartbeat so the heart can pump more effectively:  · Pacemaker. A pacemaker is an implanted device that regulates your heartbeat electronically. It monitors your heart's rhythm and generates a painless electric impulse that helps the heart beat in a regular rhythm. A pacemaker is programmed to meet your specific heart rhythm needs.  · Biventricular pacing/cardiac resynchronization therapy. A type of pacemaker that paces both pumping chambers of the heart at the same time to coordinate contractions and to improve the heart's function. Some people with heart failure are candidates for this therapy.  · Implantable cardioverter defibrillator. A device similar to a pacemaker that senses when the heart is beating too fast and delivers an electrical shock to convert the fast rhythm to a normal rhythm. This can be a life saving device.  In severe cases  In more serious cases of heart failure when other treatments no longer work, other options may include:  · Ventricular assist devices (VADs). These are mechanical devices used to take over the pumping function for one or both of the heart's ventricles, or pumping chambers. A VAD may be necessary when heart failure progresses to the point that medicines and other treatments no longer help. In some cases, a VAD may be used as a bridge to transplant.  · Heart transplant. This is replacing the diseased heart with a healthy one from a donor. This is an option for a few people who are very sick. A heart transplant is very serious and not an option for all patients. Your doctor can tell you more.  Date Last Reviewed: 3/20/2016  © 7992-6186 [employer]. [address] [place] [address], [place] [address]. All rights reserved. This information is not intended as a substitute for professional medical care. Always follow your healthcare professional's instructions.              Heart Failure: Tracking Your Weight  You have a condition called heart failure. When you have  heart failure, a sudden weight gain or a steady rise in weight is a warning sign that your body is retaining too much water and salt. This could mean your heart failure is getting worse. If left untreated, it can cause problems for your lungs and result in shortness of breath. Weighing yourself each day is the best way to know if youre retaining water. If your weight goes up quickly, call your doctor. You will be given instructions on how to get rid of the excess water. You will likely need medicines and to avoid salt. This will help your heart work better.  Call your doctor if you gain more than 2 pounds in 1 day, more than 5 pounds in 1 week, or whatever weight gain you were told to report by your doctor. This is often a sign of worsening heart failure and needs to be evaluated and treated. Your doctor will tell you what to do next.   Tips for weighing yourself    · Weigh yourself at the same time each morning, wearing the same clothes. Weigh yourself after urinating and before eating.  · Use the same scale each day. Make sure the numbers are easy to read. Put the scale on a flat, hard surface -- not on a rug or carpet.  · Do not stop weighing yourself. If you forget one day, weigh again the next morning.  How to use your weight chart  · Keep your weight chart near the scale. Write your weight on the chart as soon as you get off the scale.  · Fill in the month and the start date on the chart. Then write down your weight each day. Your chart will look like this:    · If you miss a day, leave the space blank. Weigh yourself the next day and write your weight in the next space.  · Take your weight chart with you when you go to see your doctor.  Date Last Reviewed: 3/20/2016  © 1366-4471 [employer]. [address] [place] [address], [place] [address]. All rights reserved. This information is not intended as a substitute for professional medical care. Always follow your healthcare professional's  instructions.              Heart Failure: Warning Signs of a Flare-Up  You have a condition called heart failure. Once you have heart failure, flare-ups can happen. Below are signs that can mean your heart failure is getting worse. If you notice any of these warning signs, call your healthcare provider.  Swelling    · Your feet, ankles, or lower legs get puffier.  · You notice skin changes on your lower legs.  · Your shoes feel too tight.  · Your clothes are tighter in the waist.  · You have trouble getting rings on or off your fingers.  Shortness of breath  · You have to breathe harder even when youre doing your normal activities or when youre resting.  · You are short of breath walking up stairs or even short distances.  · You wake up at night short of breath or coughing.  · You need to use more pillows or sit up to sleep.  · You wake up tired or restless.  Other warning signs  · You feel weaker, dizzy, or more tired.  · You have chest pain or changes in your heartbeat.  · You have a cough that wont go away.  · You cant remember things or dont feel like eating.  Tracking your weight  Gaining weight is often the first warning sign that heart failure is getting worse. Gaining even a few pounds can be a sign that your body is retaining excess water and salt. Weighing yourself each day in the morning after you urinate and before you eat, is the best way to know if you're retaining water. Get a scale that is easy to read and make sure you wear the same clothes and use the same scale every time you weigh. Your healthcare provider will show you how to track your weight. Call your doctor if you gain more than 2 pounds in 1 day, 5 pounds in 1 week, or whatever weight gain you were told to report by your doctor. This is often a sign of worsening heart failure and needs to be evaluated and treated before it compromises your breathing. Your doctor will tell you what to do next.    Date Last Reviewed: 3/15/2016  ©  1924-6694 [employer]. [address] [place] [address], [place] [address]. All rights reserved. This information is not intended as a substitute for professional medical care. Always follow your healthcare professional's instructions.              Coumadin Discharge Instructions                         Chronic Kindey Disease Education             Diabetes Discharge Instructions                                   [facility] Sign-Up     Activating your [facility] account is as easy as 1-2-3!     1) Visit [email or web address], select Sign Up Now, enter this activation code and your date of birth, then select Next.  [email or web address]  Expires: 4/19/2017  2:37 PM      2) Create a username and password to use when you visit [facility] in the future and select a security question in case you lose your password and select Next.    3) Enter your e-mail address and click Sign Up!    Additional Information  If you have questions, please e-mail [email or web address] or call [phone] to talk to our [facility] staff. Remember, [facility] is NOT to be used for urgent needs. For medical emergencies, dial 911.          [facility] complies with applicable Federal civil rights laws and does not discriminate on the basis of race, color, national origin, age, disability, or sex.

## 2017-03-13 NOTE — ASSESSMENT & PLAN NOTE
[facility].  Start Coumadin and Bridge with Full Dose Lovenox  Monitor INR  Will need written RX for warfarin #90 when discharged

## 2017-03-13 NOTE — TELEPHONE ENCOUNTER
Pt complains of numbness to one side of face and speech changes within past 10 min. Pt advised to hang up and call #[phone] at this time. Pt asking about cost of ambulance. Advised again to hang up and call EMS #[phone] now.

## 2017-03-13 NOTE — ASSESSMENT & PLAN NOTE
Neuro checks   Unable to do MRI due to PPM  Creat 1.2---> Will do CTA Head  Monitor on telemetry

## 2017-03-13 NOTE — H&P
"[facility] Hospital Medicine  History & Physical    Patient Name: Earnest Wallis  MRN: [id number]  Admission Date: 3/13/2017  Attending Physician: Cadence Rodriges MD   Primary Care Provider: [facility]         Patient information was obtained from patient and ER records.     Subjective:     Principal Problem:TIA due to embolism    Chief Complaint:   Chief Complaint   Patient presents with    Tingling     "I'm having a stroke" seen here for same symptoms recently and having same things; right facial droop and tingling        HPI: Mr. Wallis is a 62yo M with a PMH HTN, CAD, MI, DM2, Current Smoker, and most recently Acute Left Ventricle Thrombus. He was discharged on 3/6/17 with Apixaban, but [facility]. He presents back to the hospital tonight with complaints of right facial and hand numbness and tingling, which is getting better. It woke him up about 1am this morning. He had a Ct Head 3/4/17, which was negative for anything acute. Full dose lovenox was initiated in the ED. He currently denies pain or discomforts    Past Medical History:   Diagnosis Date    Cancer     skin cancer left shoulder    Diabetes mellitus     Hypertension     MI, old 2014       Past Surgical History:   Procedure Laterality Date    CARDIAC PACEMAKER PLACEMENT  2014    CARDIAC PACEMAKER PLACEMENT      EYE SURGERY Bilateral     laser corrective surgery    SHOULDER SURGERY Left     TONSILLECTOMY         Review of patient's allergies indicates:  No Known Allergies    No current facility-administered medications on file prior to encounter.      Current Outpatient Prescriptions on File Prior to Encounter   Medication Sig    apixaban 5 mg Tab Take 2 tablets (10 mg total) by mouth 2 (two) times daily. 10 mg twice daily x 10 days then 5 mg twice daily.    aspirin (ECOTRIN) 81 MG EC tablet Take 1 tablet (81 mg total) by mouth once daily.    atorvastatin (LIPITOR) 40 " MG tablet Take 1 tablet (40 mg total) by mouth once daily.    carvedilol (COREG) 25 MG tablet Take 12.5 mg by mouth 2 (two) times daily with meals.    furosemide (LASIX) 40 MG tablet Take 1 tablet (40 mg total) by mouth daily as needed.    insulin NPH-insulin regular, 70/30, (NOVOLIN 70/30) 100 unit/mL (70-30) injection Inject 30 Units into the skin once daily.    lisinopril (PRINIVIL,ZESTRIL) 5 MG tablet Take 1 tablet (5 mg total) by mouth once daily.    metformin (GLUCOPHAGE) 1000 MG tablet Take 1,000 mg by mouth 2 (two) times daily with meals.    potassium chloride SA (K-DUR,KLOR-CON) 20 MEQ tablet Take 20 mEq by mouth once daily.     Family History     None        Social History Main Topics    Smoking status: Current Every Day Smoker     Packs/day: 0.50     Types: Cigarettes    Smokeless tobacco: Not on file    Alcohol use No    Drug use: No    Sexual activity: Not on file     Review of Systems   Constitutional: Negative for appetite change and fever.   HENT: Negative for congestion.    Eyes: Negative for visual disturbance.   Respiratory: Negative for shortness of breath.    Cardiovascular: Negative for chest pain and leg swelling.   Gastrointestinal: Negative for abdominal pain, diarrhea, nausea and vomiting.   Genitourinary: Negative for dysuria.   Musculoskeletal: Negative for arthralgias.   Skin: Negative for wound.   Neurological: Positive for numbness.        Right facial and hand tingling/numbness   Hematological: Does not bruise/bleed easily.   Psychiatric/Behavioral: Negative for confusion and hallucinations.     Objective:     Vital Signs (Most Recent):  Temp: 97.9 °F (36.6 °C) (03/13/17 0157)  Pulse: 71 (03/13/17 0157)  Resp: 20 (03/13/17 0157)  BP: (!) 131/92 (03/13/17 0157)  SpO2: 98 % (03/13/17 0157) Vital Signs (24h Range):  Temp:  [97.9 °F (36.6 °C)] 97.9 °F (36.6 °C)  Pulse:  [71] 71  Resp:  [20] 20  SpO2:  [98 %] 98 %  BP: (131)/(92) 131/92     Weight: 113.4 kg (250 lb)  Body mass  index is 32.1 kg/(m^2).    Physical Exam   Constitutional: He is oriented to person, place, and time. He appears well-developed and well-nourished. No distress.   HENT:   Head: Normocephalic and atraumatic.   Eyes: Conjunctivae are normal. Pupils are equal, round, and reactive to light.   Neck: Normal range of motion. Neck supple. No JVD present. No tracheal deviation present.   Cardiovascular: Normal rate, regular rhythm, normal heart sounds and intact distal pulses.    Pulmonary/Chest: Effort normal and breath sounds normal. No respiratory distress.   Abdominal: Soft. Bowel sounds are normal. He exhibits no distension. There is no tenderness.   Musculoskeletal: Normal range of motion. He exhibits no edema.   Neurological: He is alert and oriented to person, place, and time. No cranial nerve deficit.   Skin: Skin is warm, dry and intact.   Psychiatric: He has a normal mood and affect. His behavior is normal. Judgment and thought content normal.        Significant Labs:   CBC:   Recent Labs  Lab 03/13/17  0234   WBC 9.90   HGB 16.6   HCT 50.5        CMP:   Recent Labs  Lab 03/13/17  0234      K 4.1      CO2 24   *   BUN 19   CREATININE 1.4   CALCIUM 9.4   PROT 7.3   ALBUMIN 3.8   BILITOT 0.7   ALKPHOS 78   AST 22   ALT 54*   ANIONGAP 11   EGFRNONAA 53*     Coagulation:   Recent Labs  Lab 03/13/17 0234   INR 1.0   APTT 27.0       Significant Imaging: None Today    CT Head 3/4/17:  Impression        No acute intracranial process.   Small chronic right cerebellar infarct.               Carotid u/s 3/5/17:   Impression     No significant carotid stenosis.         Echocardiogram 3/5/17:   CONCLUSIONS     1 - Enlarged left ventricular enlargement.     2 - Eccentric hypertrophy.     3 - Moderately to severely depressed left ventricular systolic function (EF 25-30%).     4 - Multiple regional wall motion abnormalities consistent with prior apical infarction and multivessel disease..     5 - 1.5 cm  sessile apical thrombus.     6 - Normal left ventricular diastolic function.     7 - Right ventricular enlargement with normal systolic function.     8 - The estimated PA systolic pressure is greater than 18 mmHg.     9 - Difficult apical windows, Optison contrast used for wall motion analysis.     Assessment/Plan:     * TIA due to embolism  Neuro checks   Unable to do MRI due to PPM  Creat 1.2---> Will do CTA Head  Hold Glucophage x48hrs due to getting contrast for CTA head  Gentle IVF Hydration due to contrast.  Monitor on telemetry        Acute thrombus of left ventricle  [facility] only [facility] generics.  Start Coumadin and Bridge with Full Dose Lovenox  Monitor INR  Will need written RX for warfarin #90 when discharged      Chronic combined systolic and diastolic CHF (congestive heart failure)  Stable. Continue home diuretic, ACEI, and BB      CKD stage 3  Stable. Creatinin 1.2, BUN 19, GFR 53  Monitor labs  Hold Glucophage x48hrs due to getting contrast for CTA head        Essential hypertension  Chronic/Controlled. Continue home BP medications, adjusting as needed.      Type 2 diabetes mellitus with complication  Chronic/Controlled.  Monitor blood sugars and SSI  Hold home oral DM medications while in the hospital.      Moderate tobacco dependence  Smoking Cessation encouraged  Nicotine patch      VTE Risk Mitigation         Ordered     warfarin (COUMADIN) tablet 5 mg  Daily     Route:  Oral        03/13/17 0425     High Risk of VTE  Once     Lovenox Bridging 03/13/17 0425        Roxanne Graham NP  Department of Hospital Medicine   [facility]

## 2017-03-14 VITALS
TEMPERATURE: 97 F | BODY MASS INDEX: 32.08 KG/M2 | SYSTOLIC BLOOD PRESSURE: 115 MMHG | HEART RATE: 61 BPM | RESPIRATION RATE: 20 BRPM | DIASTOLIC BLOOD PRESSURE: 82 MMHG | OXYGEN SATURATION: 95 % | HEIGHT: 74 IN | WEIGHT: 250 LBS

## 2017-03-14 LAB
ANION GAP SERPL CALC-SCNC: 9 MMOL/L
BASOPHILS # BLD AUTO: 0 K/UL
BASOPHILS NFR BLD: 0.5 %
BUN SERPL-MCNC: 16 MG/DL
CALCIUM SERPL-MCNC: 8.8 MG/DL
CHLORIDE SERPL-SCNC: 107 MMOL/L
CO2 SERPL-SCNC: 22 MMOL/L
CREAT SERPL-MCNC: 1.2 MG/DL
DIFFERENTIAL METHOD: ABNORMAL
EOSINOPHIL # BLD AUTO: 0.3 K/UL
EOSINOPHIL NFR BLD: 3.2 %
ERYTHROCYTE [DISTWIDTH] IN BLOOD BY AUTOMATED COUNT: 14.6 %
EST. GFR  (AFRICAN AMERICAN): >60 ML/MIN/1.73 M^2
EST. GFR  (NON AFRICAN AMERICAN): >60 ML/MIN/1.73 M^2
GLUCOSE SERPL-MCNC: 115 MG/DL
HCT VFR BLD AUTO: 46.7 %
HGB BLD-MCNC: 15.2 G/DL
INR PPP: 1.1
LYMPHOCYTES # BLD AUTO: 3.3 K/UL
LYMPHOCYTES NFR BLD: 41.2 %
MCH RBC QN AUTO: 29.3 PG
MCHC RBC AUTO-ENTMCNC: 32.5 %
MCV RBC AUTO: 90 FL
MONOCYTES # BLD AUTO: 0.8 K/UL
MONOCYTES NFR BLD: 10.4 %
NEUTROPHILS # BLD AUTO: 3.6 K/UL
NEUTROPHILS NFR BLD: 44.7 %
PLATELET # BLD AUTO: 167 K/UL
PMV BLD AUTO: 10 FL
POCT GLUCOSE: 191 MG/DL (ref 70–110)
POTASSIUM SERPL-SCNC: 4 MMOL/L
PROTHROMBIN TIME: 11.3 SEC
RBC # BLD AUTO: 5.18 M/UL
SODIUM SERPL-SCNC: 138 MMOL/L
WBC # BLD AUTO: 7.9 K/UL

## 2017-03-14 PROCEDURE — 25000003 PHARM REV CODE 250: Performed by: NURSE PRACTITIONER

## 2017-03-14 PROCEDURE — G0378 HOSPITAL OBSERVATION PER HR: HCPCS

## 2017-03-14 PROCEDURE — G0009 ADMIN PNEUMOCOCCAL VACCINE: HCPCS | Performed by: FAMILY MEDICINE

## 2017-03-14 PROCEDURE — 63600175 PHARM REV CODE 636 W HCPCS: Performed by: NURSE PRACTITIONER

## 2017-03-14 PROCEDURE — 63600175 PHARM REV CODE 636 W HCPCS: Performed by: FAMILY MEDICINE

## 2017-03-14 PROCEDURE — 85610 PROTHROMBIN TIME: CPT

## 2017-03-14 PROCEDURE — 36415 COLL VENOUS BLD VENIPUNCTURE: CPT

## 2017-03-14 PROCEDURE — 90471 IMMUNIZATION ADMIN: CPT | Performed by: FAMILY MEDICINE

## 2017-03-14 PROCEDURE — 90670 PCV13 VACCINE IM: CPT | Performed by: FAMILY MEDICINE

## 2017-03-14 PROCEDURE — 85025 COMPLETE CBC W/AUTO DIFF WBC: CPT

## 2017-03-14 PROCEDURE — 80048 BASIC METABOLIC PNL TOTAL CA: CPT

## 2017-03-14 RX ORDER — WARFARIN SODIUM 5 MG/1
5 TABLET ORAL DAILY
Qty: 30 TABLET | Refills: 0 | Status: SHIPPED | OUTPATIENT
Start: 2017-03-14 | End: 2017-03-17

## 2017-03-14 RX ORDER — ENOXAPARIN SODIUM 150 MG/ML
1 INJECTION SUBCUTANEOUS EVERY 12 HOURS
Qty: 6.4 ML | Refills: 0 | Status: SHIPPED | OUTPATIENT
Start: 2017-03-14 | End: 2017-03-17 | Stop reason: ALTCHOICE

## 2017-03-14 RX ADMIN — LISINOPRIL 5 MG: 2.5 TABLET ORAL at 10:03

## 2017-03-14 RX ADMIN — CARVEDILOL 12.5 MG: 6.25 TABLET, FILM COATED ORAL at 10:03

## 2017-03-14 RX ADMIN — ENOXAPARIN SODIUM 120 MG: 150 INJECTION SUBCUTANEOUS at 10:03

## 2017-03-14 RX ADMIN — ASPIRIN 81 MG: 81 TABLET, COATED ORAL at 10:03

## 2017-03-14 RX ADMIN — POTASSIUM CHLORIDE 20 MEQ: 20 TABLET, EXTENDED RELEASE ORAL at 10:03

## 2017-03-14 RX ADMIN — PNEUMOCOCCAL 13-VALENT CONJUGATE VACCINE 0.5 ML: 2.2; 2.2; 2.2; 2.2; 2.2; 4.4; 2.2; 2.2; 2.2; 2.2; 2.2; 2.2; 2.2 INJECTION, SUSPENSION INTRAMUSCULAR at 01:03

## 2017-03-14 RX ADMIN — ATORVASTATIN CALCIUM 40 MG: 40 TABLET, FILM COATED ORAL at 10:03

## 2017-03-14 RX ADMIN — INSULIN ASPART 30 UNITS: 100 INJECTION, SUSPENSION SUBCUTANEOUS at 08:03

## 2017-03-14 RX ADMIN — FUROSEMIDE 60 MG: 20 TABLET ORAL at 10:03

## 2017-03-14 NOTE — CONSULTS
"ALLERGIES:  No known drug allergies.    HISTORY OF PRESENT ILLNESS:  Mr. Earnest Wallis is a 61-year-old right-handed   white male with history of insulin-dependent diabetes mellitus, hypertension,   recently diagnosed left ventricular thrombus, coronary arterial disease, prior   past myocardial infarction, cardiac pacemaker with left ventricular hypertrophy,   who was hospitalized here last week, and returned early this morning after he   states he developed "two strokes."  He states that the first stroke occurred on   Saturday and the second one early this morning and one in the morning.  On both   instances, he felt his right face and his right hand become suddenly numb.  With   the event this morning, he also states that he "could not swallow."  He noticed   on Saturday, his right face was pulling towards the right side.  He did not   note any weakness of the upper or lower extremities.  He states that both of   these separate episodes lasted 30 minutes, each.    He was started on Coumadin today.    The history is from the patient, who is a fair historian.  No relatives or   friends were available during the interview and examination.  I reviewed labs,   results, vitals, medications, and EHR notes.    The patient had a CAT scan last week dated 03/04/2017 and he denies having had   any new CAT scans of the brain today (none noted on EPIC).  He cannot have MRIs   due to the pacemaker.  The report of the ultrasound of the carotids dated   03/05/2017 was unremarkable.    I reviewed the report and images of the brain CT dated 03/04/2017.  There is no   sign of mass, mass effect, subdural hematoma, subarachnoid hemorrhage,   intraparenchymal hemorrhage, hygroma, edema, normal pressure hydrocephalus,   pseudotumor cerebri, posterior fossa/brainstem/pituitary gland pathology, sulci   effacement or other sign of acute/evolving cerebrovascular accident.  I   discussed these normal results with the patient " today.    EXAMINATION:  The patient is fully awake, alert, attentive.  There is no sign of   paranoia, psychosis, looseness of associations, flight of ideas, response to   internal stimuli, solange, hypomania, pressured speech, tangential thought   processes, thought blocking, auditory or visual hallucinations, delirium, acute   confusional state, DTs, postictal state, dysarthria, aphasia, dementing   disorder.  He is friendly and has a good sense of humor.    His body habitus is obese.  He is 6 feet 2 inches tall, 250 pounds.  He is in no   apparent distress.    The head is atraumatic and normocephalic.  He has poor dentition.  The tongue   protrudes in midline, and is without fasciculation, atrophy or contusion.  There   is no nuchal rigidity.  There is no meningismus.  There is no sign of temporal   arteritis.  There is no temporalis muscle atrophy.  There is no neck dystonia.    There is no head titubation.  There is facial symmetry.  Pinprick sensation   throughout the face and around the lips is unremarkable without any deficits.    The pupils are equally round and reactive to light.  Extraocular movements are   intact.  There is no ophthalmoplegia, ptosis, no nystagmus.    Strength is 5/5 in bilateral upper and lower extremities with normal tone.    There is no atrophy, fasciculations, dystonia, dyskinesia, myoclonus, chorea,   tremor, foot/wrist drop, sign of restless limb syndrome, pronator drift, leg   spasticity.  He is able to lift both legs off the mattress without difficulty.    Deep tendon reflexes are trace in bilateral upper and lower extremities and   trace to absent at the ankles.  There are no Babinski, Chaddock, or Janel   signs.  There is no ankle clonus.    There is no ataxia or dysmetria, with intact fine finger movement,   finger-to-nose, rapid alternating motion, and heel-to-shin.  There is no truncal   ataxia.    There are no signs of parkinsonism including no grasp reflexes, cogwheeling,    bradykinesia, sialorrhea, scanning speech, pill rolling tremor, hypomania,   gegenhalten tone.    IMPRESSION:  Status post TIAs.    PLAN:  I will obtain a new baseline CAT scan of the brain.  I agree with   Coumadin.  Should the CAT scan of the brain be unremarkable, I will then sign   off.  I have discussed the above with the patient in great detail.  He is to   follow up with outpatient neurologist.    Thank you very much for consultation for allowing me to participate in the care   of your patient.      AALIYAH  dd: 03/13/2017 17:49:30 (CDT)  td: 03/13/2017 23:03:04 (CDT)  Doc ID   #[id number]  Job ID #[id number]    CC:

## 2017-03-14 NOTE — DISCHARGE SUMMARY
[facility] Hospital Medicine  Discharge Summary      Patient Name: Earnest Wallis  MRN: [id number]  Admission Date: 3/13/2017  Hospital Length of Stay: 0 days  Discharge Date and Time: 3/14/2017  2:30 PM  Attending Physician: Cadence Rodriges MD    Discharging Provider: Cadence Rodriges MD  Primary Care Provider: Vipul Fishman MD      HPI:   Mr. Wallis is a 62yo M with a PMH HTN, CAD, MI, DM2, Current Smoker, and most recently Acute Left Ventricle Thrombus. He was discharged on 3/6/17 with Apixaban, but [facility] not [facility]. He presents back to the hospital tonight with complaints of right facial and hand numbness and tingling, which is getting better. It woke him up about 1am this morning. He had a Ct Head 3/4/17, which was negative for anything acute. Full dose lovenox was initiated in the ED. He currently denies pain or discomforts      Indwelling Lines/Drains at time of discharge:   Lines/Drains/Airways          No matching active lines, drains, or airways        Hospital Course:   Neuro eval negative for CVA. AC resumed with lovenox and coumadin. Patient examined at bedside on day of discharge. Exam findings within normal limits. He was deemed safe for discharge.       Consults:   Consults         Status Ordering Provider     Inpatient consult to Neurology  Once     Provider:  MD Yakov Pena ADELAIDE     Nutrition Services Referral  Once     Provider:  (Not yet assigned)    SHALONDA Bethea          Significant Diagnostic Studies: Radiology: CT scan: head   Impression   No acute intracranial abnormalities are appreciated.  No interval change in the imaging appearance of the brain when compared with the prior study.       Pending Diagnostic Studies:     None        Final Active Diagnoses:    Diagnosis Date Noted POA    PRINCIPAL PROBLEM:  TIA due to embolism [G45.9, I74.9] 03/13/2017 Yes    Essential hypertension [I10] 03/13/2017 Yes     CKD (chronic kidney disease) stage 3, GFR 30-59 ml/min [N18.3] 03/13/2017 Yes    Acute thrombus of left ventricle [I21.3] 03/05/2017 Yes    Moderate tobacco dependence [F17.200] 03/05/2017 Yes    Chronic combined systolic and diastolic CHF (congestive heart failure) [I50.42] 03/05/2017 Yes    Type 2 diabetes mellitus with complication [E11.8] 03/05/2017 Yes      Problems Resolved During this Admission:    Diagnosis Date Noted Date Resolved POA      * TIA due to embolism  Resolved. Stressed importance of OAC. Pt verbalized understanding.         Chronic combined systolic and diastolic CHF (congestive heart failure)  Stable. Continue home diuretic, ACEI, and BB      Type 2 diabetes mellitus with complication  Chronic/Controlled. Resume home meds.        Moderate tobacco dependence  Smoking Cessation encouraged  Nicotine patch      Acute thrombus of left ventricle  [facility] [place] [facility]. Patient says this is fraud- he has never traveled to [place]   Agrees to continue lovenox and coumadin. Has fu appt with pcp 3/17/17.       Essential hypertension  Chronic/Controlled. Continue home BP medications.      CKD (chronic kidney disease) stage 3, GFR 30-59 ml/min  Stable.       Discharged Condition: stable    Disposition: [place]-Health Care [facility]    Follow Up:  Follow-up Information     Follow up with Vipul Fishman MD On 3/17/2017.    Specialties:  Family Medicine, Internal Medicine    Contact information:    [address]  [facility] [address]  [phone]          Follow up with [facility].    Specialty:  Home Health Services    Why:  Home Health    Contact information:    [address]  [facility] [id number] [phone]          Patient Instructions:     Referral to Home health   Referral Priority: Routine Referral Type: Home Health   Referral Reason: Specialty Services Required    Requested Specialty: Home Health Services    Number of Visits Requested: 1       Diet Diabetic 2000 Calories       Medications:  Reconciled Home Medications:   Discharge Medication List as of 3/14/2017  1:20 PM      START taking these medications    Details   enoxaparin (LOVENOX) 120 mg/0.8 mL Syrg Inject 0.8 mLs (120 mg total) into the skin every 12 (twelve) hours., Starting 3/14/2017, Until Sat 3/18/17, Normal      warfarin (COUMADIN) 5 MG tablet Take 1 tablet (5 mg total) by mouth Daily., Starting 3/14/2017, Until Wed 3/14/18, Normal         CONTINUE these medications which have NOT CHANGED    Details   atorvastatin (LIPITOR) 40 MG tablet Take 1 tablet (40 mg total) by mouth once daily., Starting 3/7/2017, Until Wed 3/7/18, Print      carvedilol (COREG) 25 MG tablet Take 12.5 mg by mouth 2 (two) times daily with meals., Until Discontinued, Historical Med      furosemide (LASIX) 40 MG tablet Take 1 tablet (40 mg total) by mouth daily as needed., Starting 3/6/2017, Until Tue 3/6/18, Print      insulin NPH-insulin regular, 70/30, (NOVOLIN 70/30) 100 unit/mL (70-30) injection Inject 30 Units into the skin once daily., Until Discontinued, Historical Med      lisinopril (PRINIVIL,ZESTRIL) 5 MG tablet Take 1 tablet (5 mg total) by mouth once daily., Starting 3/6/2017, Until Tue 3/6/18, Print      metformin (GLUCOPHAGE) 1000 MG tablet Take 1,000 mg by mouth 2 (two) times daily with meals., Until Discontinued, Historical Med      potassium chloride SA (K-DUR,KLOR-CON) 20 MEQ tablet Take 20 mEq by mouth once daily., Until Discontinued, Historical Med         STOP taking these medications       apixaban 5 mg Tab Comments:   Reason for Stopping:         aspirin (ECOTRIN) 81 MG EC tablet Comments:   Reason for Stopping:             Time spent on the discharge of patient: 44 minutes    Cadence Rodriges MD  Department of Hospital Medicine  [facility]

## 2017-03-14 NOTE — PROGRESS NOTES
[facility] sent patient information to [facility] through [facility] for processing and acceptance.WILL Mclaughlin    The referral for the patient in [facility]/PCU, room 215, bed 215 A admitted at 3/13/2017 1:54 AM has been updated by [email or web address].  Update: Accepted.

## 2017-03-14 NOTE — PLAN OF CARE
"Problem: Fall Risk (Adult)  Goal: Identify Related Risk Factors and Signs and Symptoms  Related risk factors and signs and symptoms are identified upon initiation of Human Response Clinical Practice Guideline (CPG)   Outcome: Ongoing (interventions implemented as appropriate)  Alert/oriented. Likes to converse  Denies pain/discomfort  Tele: 100% paced  Ambulates without problems  Daily weight  Discussed smoking cessation: Patient admits to not being ready to quit. States "smoking is not the issue"  Tolerating IVFs without problems  Halley urine  Safe       "

## 2017-03-14 NOTE — ASSESSMENT & PLAN NOTE
[facility] [place]. Patient says this is fraud- [place]   Agrees to continue lovenox and coumadin. Has fu appt with pcp 3/17/17.

## 2017-03-14 NOTE — PROGRESS NOTES
"1100  Met with patient at bedside regarding new scripts for lovenox and coumadin.  Patient stated yesterday [facility]  Questioned patient if he will bring the scripts to the [facility] or if this  needs to contact the [facility].  Patient stated "well apparently they won't honor prescriptions from y'all so I guess I'm SOL".  Informed patient that this  would contact the [facility].    1103  [facility] and [facility] to [facility]in in the [facility] ([facility] she stated that patient did fill the apixaban on 03/09/17 however if he needs more medications than he can bring the prescriptions to the [facility] as he did last week.    1153  Updated Dr Rodriges; she will meet with patient to discuss med's.  "

## 2017-03-14 NOTE — NURSING
Pt's medication and discharge instructions given and reviewed, understanding verbalized.  PIV and telemetry monitor removed.  Awaiting transportation.

## 2017-03-15 NOTE — ED PROVIDER NOTES
"Encounter Date: 3/13/2017       History     Chief Complaint   Patient presents with    Tingling     "I'm having a stroke" seen here for same symptoms recently and having same things; right facial droop and tingling     Review of patient's allergies indicates:  No Known Allergies  HPI Comments: This patient is a 61-year-old male presenting to the emergency department with complaints of a brief period of Transient right face numbness and droop that lasted about 20 minutes and resolved PTA.  He reports 1 week ago he was discharged from this hospital after a workup for TIA which indicated he has a 1.5 cm apical thrombus on the left side of his heart.  He reports he was diagnosed with a prescription for pixel van but that he was unable to fill [facility] because [facility] not [facility].  At this time he denies ongoing symptoms, including chest pain, confusion, visual auditory changes, speech deficits, difficulty breathing, unilateral or bilateral lower extremity swelling.    The history is provided by the patient.     Past Medical History:   Diagnosis Date    Cancer     skin cancer left shoulder    Diabetes mellitus     Hypertension     MI, old 2014     Past Surgical History:   Procedure Laterality Date    CARDIAC PACEMAKER PLACEMENT  2014    CARDIAC PACEMAKER PLACEMENT      EYE SURGERY Bilateral     laser corrective surgery    SHOULDER SURGERY Left     TONSILLECTOMY       History reviewed. No pertinent family history.  Social History   Substance Use Topics    Smoking status: Current Every Day Smoker     Packs/day: 0.50     Types: Cigarettes    Smokeless tobacco: None    Alcohol use No     Review of Systems   Constitutional: Negative for fever.   HENT: Negative for congestion.    Eyes: Negative for visual disturbance.   Respiratory: Negative for shortness of breath.    Cardiovascular: Negative for chest pain.   Gastrointestinal: Negative for abdominal pain.   Genitourinary: Negative for dysuria. "   Musculoskeletal: Positive for back pain.   Skin: Negative for wound.   Neurological: Positive for weakness.   Psychiatric/Behavioral: Negative for confusion.       Physical Exam   Initial Vitals   BP Pulse Resp Temp SpO2   03/13/17 0157 03/13/17 0157 03/13/17 0157 03/13/17 0157 03/13/17 0157   131/92 71 20 97.9 °F (36.6 °C) 98 %     Physical Exam    Nursing note and vitals reviewed.  Constitutional: He appears well-developed and well-nourished. No distress.   HENT:   Head: Normocephalic and atraumatic.   Eyes: Conjunctivae and EOM are normal. Right eye exhibits no discharge. Left eye exhibits no discharge.   Neck: Normal range of motion. Neck supple.   Cardiovascular: Normal rate and regular rhythm.   Pulmonary/Chest: Breath sounds normal. No respiratory distress. He has no wheezes.   Abdominal: Soft. Bowel sounds are normal. He exhibits no distension. There is no tenderness.   Musculoskeletal: He exhibits no edema or tenderness.   Neurological: He is alert and oriented to person, place, and time. He has normal strength. No cranial nerve deficit or sensory deficit.   Skin: Skin is warm and dry. No erythema.   Psychiatric: He has a normal mood and affect. Thought content normal.         ED Course   Procedures  Labs Reviewed   CBC W/ AUTO DIFFERENTIAL - Abnormal; Notable for the following:        Result Value    RDW 14.8 (*)     All other components within normal limits   COMPREHENSIVE METABOLIC PANEL - Abnormal; Notable for the following:     Glucose 135 (*)     ALT 54 (*)     eGFR if non  53 (*)     All other components within normal limits   APTT   PROTIME-INR             Medical Decision Making:   Initial Assessment:   The patient was interviewed and examined and found to be fully neurologically intact at the time of my evaluation.  It does appear he is continuing to shower small emboli from his left ventricular thrombus.  He has not been on anticoagulation and at this time I do not feel  additional imaging studies are warranted.  The patient does warrant admission for further anticoagulation and bridging to Coumadin.  I did speak with the on-call admitting nurse practitioner regarding this, which he accepted.  Differential Diagnosis:   DDX include, but are not limited to, TIA, SAH, complex migraine, medication noncompliance  ED Management:  Patient was provided initial dosing of Lovenox here in the emergency department.  He'll be transferred to telemetry bed in guarded condition.                   ED Course     Clinical Impression:   The primary encounter diagnosis was Noncompliance with medication regimen. A diagnosis of TIA due to embolism was also pertinent to this visit.      Disposition:   Disposition: Discharged  Condition: Stable       Tereso Castillo MD  03/15/17 3099

## 2017-03-15 NOTE — PLAN OF CARE
03/15/17 1635   Final Note   Assessment Type Final Discharge Note   Discharge Disposition Home-Health   Discharge planning education complete? Yes   Hospital Follow Up  Appt(s) scheduled? Yes

## 2017-03-17 ENCOUNTER — OFFICE VISIT (OUTPATIENT)
Dept: FAMILY MEDICINE | Facility: CLINIC | Age: 62
End: 2017-03-17
Payer: MEDICARE

## 2017-03-17 VITALS
BODY MASS INDEX: 33.33 KG/M2 | WEIGHT: 259.69 LBS | SYSTOLIC BLOOD PRESSURE: 102 MMHG | TEMPERATURE: 98 F | OXYGEN SATURATION: 97 % | HEART RATE: 70 BPM | DIASTOLIC BLOOD PRESSURE: 74 MMHG | RESPIRATION RATE: 16 BRPM | HEIGHT: 74 IN

## 2017-03-17 DIAGNOSIS — E11.9 DIABETIC EYE EXAM: ICD-10-CM

## 2017-03-17 DIAGNOSIS — I10 ESSENTIAL HYPERTENSION: ICD-10-CM

## 2017-03-17 DIAGNOSIS — I24.0: Primary | ICD-10-CM

## 2017-03-17 DIAGNOSIS — E78.2 MIXED HYPERLIPIDEMIA: ICD-10-CM

## 2017-03-17 DIAGNOSIS — Z01.00 DIABETIC EYE EXAM: ICD-10-CM

## 2017-03-17 DIAGNOSIS — Z11.59 ENCOUNTER FOR HEPATITIS C SCREENING TEST FOR LOW RISK PATIENT: ICD-10-CM

## 2017-03-17 PROCEDURE — 99203 OFFICE O/P NEW LOW 30 MIN: CPT | Mod: S$GLB,,, | Performed by: NURSE PRACTITIONER

## 2017-03-17 RX ORDER — FUROSEMIDE 20 MG/1
60 TABLET ORAL 2 TIMES DAILY
Status: ON HOLD | COMMUNITY
End: 2017-03-22

## 2017-03-17 NOTE — MR AVS SNAPSHOT
[facility]  [address] [address] [address]  Phone: [phone]  Fax: [phone]                  Earnest Wallis   3/17/2017 2:40 PM   Office Visit    Description:  Male : 1955   Provider:  COLIN Puckett   Department:  [facility]           Reason for Visit     Establish Care           Diagnoses this Visit        Comments    Coronary thrombosis    -  Primary     Diabetic eye exam         Encounter for hepatitis C screening test for low risk patient         Mixed hyperlipidemia         Type 2 diabetes mellitus, uncontrolled, with neuropathy         Essential hypertension         BMI 33.0-33.9,adult                To Do List           Future Appointments        Provider Department Dept Phone    2017 1:00 PM Arash Marin MD [facility] - Cardiology [phone]    2017 8:40 AM LAB, [facility] [phone]    2017 1:20 PM COLIN Puckett [facility] [phone]      Goals (5 Years of Data)     None      Follow-Up and Disposition     Return in about 3 months (around 2017).    Follow-up and Disposition History       These Medications        Disp Refills Start End    apixaban 5 mg Tab 60 tablet 5 3/17/2017     1 tab po bid    Pharmacy: [facility] [address] [place] [address] Ph #: [phone]         [facility] On Call     [facility] On Call Nurse Care Line -  Assistance  Registered nurses in the [facility] provide clinical advisement, health education, appointment booking, and other advisory services.  Call for this free service at [phone].             Medications           Message regarding Medications     Verify the changes and/or additions to your medication regime listed below are the same as discussed with your clinician today.  If any of these changes or additions are incorrect, please notify your healthcare provider.       "  START taking these NEW medications        Refills    apixaban 5 mg Tab 5    Si tab po bid    Class: Print      STOP taking these medications     warfarin (COUMADIN) 5 MG tablet Take 1 tablet (5 mg total) by mouth Daily.    enoxaparin (LOVENOX) 120 mg/0.8 mL Syrg Inject 0.8 mLs (120 mg total) into the skin every 12 (twelve) hours.           Verify that the below list of medications is an accurate representation of the medications you are currently taking.  If none reported, the list may be blank. If incorrect, please contact your healthcare provider. Carry this list with you in case of emergency.           Current Medications     apixaban 5 mg Tab 1 tab po bid    atorvastatin (LIPITOR) 40 MG tablet Take 1 tablet (40 mg total) by mouth once daily.    carvedilol (COREG) 25 MG tablet Take 12.5 mg by mouth 2 (two) times daily with meals.    furosemide (LASIX) 20 MG tablet Take 60 mg by mouth 2 (two) times daily.    furosemide (LASIX) 40 MG tablet Take 1 tablet (40 mg total) by mouth daily as needed.    insulin NPH-insulin regular, 70/30, (NOVOLIN 70/30) 100 unit/mL (70-30) injection Inject 30 Units into the skin once daily.    lisinopril (PRINIVIL,ZESTRIL) 5 MG tablet Take 1 tablet (5 mg total) by mouth once daily.    metformin (GLUCOPHAGE) 1000 MG tablet Take 1,000 mg by mouth 2 (two) times daily with meals.    potassium chloride SA (K-DUR,KLOR-CON) 20 MEQ tablet Take 20 mEq by mouth once daily.           Clinical Reference Information           Your Vitals Were     BP Pulse Temp Resp Height Weight    102/74 (BP Location: Left arm, Patient Position: Sitting, BP Method: Manual) 70 97.8 °F (36.6 °C) (Oral) 16 6' 2" (1.88 m) 117.8 kg (259 lb 11.2 oz)    SpO2 BMI             97% 33.34 kg/m2         Blood Pressure          Most Recent Value    BP  102/74      Allergies as of 3/17/2017     No Known Allergies      Immunizations Administered on Date of Encounter - 3/17/2017     None      Orders Placed During Today's Visit "      Normal Orders This Visit    Ambulatory referral to Ophthalmology     Future Labs/Procedures Expected by Expires    Comprehensive metabolic panel  6/17/2017 3/17/2018    Hemoglobin A1c  6/17/2017 3/17/2018    Hepatitis C antibody  6/17/2017 3/17/2018    Lipid panel  6/17/2017 3/17/2018      [facility] Sign-Up     Activating your [facility] account is as easy as 1-2-3!     1) Visit [email or web address], select Sign Up Now, enter this activation code and your date of birth, then select Next.  [email or web address]  Expires: 4/19/2017  2:37 PM      2) Create a username and password to use when you visit [facility] in the future and select a security question in case you lose your password and select Next.    3) Enter your e-mail address and click Sign Up!    Additional Information  If you have questions, please e-mail [email or web address] or call [phone] to talk to our [facility] staff. Remember, [facility] is NOT to be used for urgent needs. For medical emergencies, dial 911.         Smoking Cessation     If you would like to quit smoking:   You may be eligible for free services if you are a [place] resident and started smoking cigarettes before September 1, 1988.  Call the Smoking Cessation Trust (SCT) toll free at (087) 904-0388 or (558) 676-1107.   Call 0-417-QUIT-NOW if you do not meet the above criteria.            Language Assistance Services     ATTENTION: Language assistance services are available, free of charge. Please call [phone].      ATENCIÓN: Si habla español, tiene a maya disposición servicios gratuitos de asistencia lingüística. Llame al [phone].     CHÚ Ý: N?u b?n nói Ti?ng Vi?t, có các d?ch v? h? tr? ngôn ng? mi?n phí dành cho b?n. G?i s? [phone].         [facility] complies with applicable Federal civil rights laws and does not discriminate on the basis of race, color, national origin, age, disability, or sex.

## 2017-03-17 NOTE — PROGRESS NOTES
Subjective:       Patient ID: Earnest Wallis is a 61 y.o. male.    Chief Complaint: Establish Care  Depression screening appears to have high score, but the reason it is high is because he has severe heart disease and can't do most of the things he would like to do.     Has a blood clot which was found about 2 weeks ago. He is currently on eliquis, but doesn't have enough to last very long, needs an RX for more.     Has diabetes, states it is well controlled. Using metformin twice a day and 70/30 insulin. Checking blood sugar daily in the am. Eats multiple small meals a day (around 7 times).  Overweight states he watches what he eats and tries to avoid sugary food.   HPI  Review of Systems   Respiratory: Negative for shortness of breath.    Cardiovascular: Positive for leg swelling. Negative for chest pain.       Objective:       Lab Results   Component Value Date    HGBA1C 8.5 (H) 03/13/2017       Physical Exam   Constitutional: He is oriented to person, place, and time. He appears well-developed and well-nourished. No distress.   HENT:   Head: Normocephalic and atraumatic.   Eyes: Conjunctivae are normal. Right eye exhibits no discharge. Left eye exhibits no discharge. No scleral icterus.   Cardiovascular: Normal rate, regular rhythm and normal heart sounds.  Exam reveals no gallop and no friction rub.    No murmur heard.  Pulses:       Dorsalis pedis pulses are 1+ on the right side, and 1+ on the left side.        Posterior tibial pulses are 1+ on the right side, and 1+ on the left side.   Pulmonary/Chest: Effort normal and breath sounds normal. No respiratory distress. He has no wheezes. He has no rales.   Musculoskeletal:        Right foot: There is normal range of motion and no deformity.        Left foot: There is normal range of motion and no deformity.   Feet:   Right Foot:   Protective Sensation: 10 sites tested. 10 sites sensed.   Skin Integrity: Positive for callus and dry skin. Negative for ulcer,  blister, skin breakdown, erythema or warmth.   Left Foot:   Protective Sensation: 10 sites tested. 7 sites sensed.   Skin Integrity: Positive for callus and dry skin. Negative for ulcer, blister, skin breakdown, erythema or warmth.   Neurological: He is alert and oriented to person, place, and time.   Skin: Skin is warm and dry. He is not diaphoretic.   Psychiatric: He has a normal mood and affect. His behavior is normal.   Nursing note and vitals reviewed.      Assessment:       1. Coronary thrombosis    2. Diabetic eye exam    3. Encounter for hepatitis C screening test for low risk patient    4. Mixed hyperlipidemia    5. Type 2 diabetes mellitus, uncontrolled, with neuropathy    6. Essential hypertension    7. BMI 33.0-33.9,adult        Plan:       Coronary thrombosis  -     apixaban 5 mg Tab; 1 tab po bid  Dispense: 60 tablet; Refill: 5    Diabetic eye exam  -     Ambulatory referral to Ophthalmology    Encounter for hepatitis C screening test for low risk patient  -     Hepatitis C antibody; Future; Expected date: 6/17/17    Mixed hyperlipidemia  -     Lipid panel; Future; Expected date: 6/17/17    Type 2 diabetes mellitus, uncontrolled, with neuropathy  -     Hemoglobin A1c; Future; Expected date: 6/17/17    Essential hypertension  -     Comprehensive metabolic panel; Future; Expected date: 6/17/17    BMI 33.0-33.9,adult         3 months with labs prior

## 2017-03-17 NOTE — LETTER
March 17, 2017      Cadence Rodriges MD  [address] [place] [address] Dr Mariam SINGH [id number]           [address] [address]  Phone: [phone]  Fax: [phone]          Patient: Earnest Wallis   MR Number: [id number]   YOB: 1955   Date of Visit: 3/17/2017       Dear Dr. Cadence Rodriges:    Thank you for referring Earnest Wallis to me for evaluation. Attached you will find relevant portions of my assessment and plan of care.    If you have questions, please do not hesitate to call me. I look forward to following Earnest Wallis along with you.    Sincerely,    Joan Flores, APRN    Enclosure  CC:  No Recipients    If you would like to receive this communication electronically, please contact [email or web address] or [phone] to request more information on [facility] access.    For providers and/or their staff who would like to refer a patient to [facility], please contact us through our one-stop-shop provider referral line, [facility], at [phone].    If you feel you have received this communication in error or would no longer like to receive these types of communications, please e-mail [email or web address]

## 2017-03-17 NOTE — PROGRESS NOTES
"1300  Call received from Amanda CUEVA stating that she received a message from Dr Tomas Ferris's nurse, stating that patient called saying that he did not receive any prescriptions when he was discharged earlier this week.  (patient was prescribed lovenox and coumadin).    1406  [facility]; was informed that patient did not fill lovenox or coumadin but did fill eliquis on 03/09.    1409  After reviewing pt's chart, the lovenox and coumadin prescriptions were sent electronically to [facility].    1410  Contacted patient regarding med's.  Informed patient that the prescriptions were sent to [facility] in error however informed [facility] that [facility].  Patient stated "yes, I'm taking the eliquis, so I don't think I need the other prescriptions".  Informed patient that he doesn't need the lovenox and coumadin if he is in fact taking the eliquis; patient confirmed that he is taking the eliquis.  Patient has an appt today at Dr Fishman's office therefore informed patient to ask for a prescriptions for eliquis refills if needed and do NOT let them send the script to [facility] to ask for a paper prescription so that [facility]; patient verbalized understanding.    1415  Updated Amanda CUEVA.  "

## 2017-03-20 ENCOUNTER — HOSPITAL ENCOUNTER (INPATIENT)
Facility: HOSPITAL | Age: 62
LOS: 1 days | Discharge: HOME | DRG: 683 | End: 2017-03-22
Attending: EMERGENCY MEDICINE | Admitting: HOSPITALIST
Payer: MEDICARE

## 2017-03-20 DIAGNOSIS — A41.9 SEPSIS: ICD-10-CM

## 2017-03-20 DIAGNOSIS — I95.9 HYPOTENSION, UNSPECIFIED HYPOTENSION TYPE: Primary | ICD-10-CM

## 2017-03-20 LAB — POCT GLUCOSE: 184 MG/DL (ref 70–110)

## 2017-03-20 PROCEDURE — 82962 GLUCOSE BLOOD TEST: CPT

## 2017-03-20 PROCEDURE — 93005 ELECTROCARDIOGRAM TRACING: CPT

## 2017-03-20 PROCEDURE — 93010 ELECTROCARDIOGRAM REPORT: CPT | Mod: ,,, | Performed by: INTERNAL MEDICINE

## 2017-03-20 PROCEDURE — 99285 EMERGENCY DEPT VISIT HI MDM: CPT | Mod: 25

## 2017-03-20 PROCEDURE — 96361 HYDRATE IV INFUSION ADD-ON: CPT

## 2017-03-20 PROCEDURE — 96374 THER/PROPH/DIAG INJ IV PUSH: CPT

## 2017-03-20 NOTE — IP AVS SNAPSHOT
[address] [place] [address]  Phone: [phone]           Patient Discharge Instructions     Our goal is to set you up for success. This packet includes information on your condition, medications, and your home care. It will help you to care for yourself so you don't get sicker and need to go back to the hospital.     Please ask your nurse if you have any questions.        There are many details to remember when preparing to leave the hospital. Here is what you will need to do:    1. Take your medicine. If you are prescribed medications, review your Medication List in the following pages. You may have new medications to  at the pharmacy and others that you'll need to stop taking. Review the instructions for how and when to take your medications. Talk with your doctor or nurses if you are unsure of what to do.     2. Go to your follow-up appointments. Specific follow-up information is listed in the following pages. Your may be contacted by a transition nurse or clinical provider about future appointments. Be sure we have all of the phone numbers to reach you, if needed. Please contact your provider's office if you are unable to make an appointment.     3. Watch for warning signs. Your doctor or nurse will give you detailed warning signs to watch for and when to call for assistance. These instructions may also include educational information about your condition. If you experience any of warning signs to your health, call your doctor.               [facility]  Unless otherwise directed by your provider, please contact [facility], our nurse care line that is available for 24/7 assistance.     [phone] (toll-free)    Registered nurses in the [facility] provide clinical advisement, health education, appointment booking, and other advisory services.                    ** Verify the list of medication(s) below is accurate and up to date.  Carry this with you in case of emergency. If your medications have changed, please notify your healthcare provider.             Medication List      START taking these medications        Additional Info    Begin Date AM Noon PM Bedtime    acetaminophen 325 MG tablet   Commonly known as:  TYLENOL   Refills:  0   Dose:  650 mg    Instructions:  Take 2 tablets (650 mg total) by mouth every 6 (six) hours as needed.                            glipiZIDE 5 MG Tr24   Commonly known as:  GLUCOTROL   Quantity:  30 tablet   Refills:  0   Dose:  5 mg    Instructions:  Take 1 tablet (5 mg total) by mouth daily with breakfast.                                 CHANGE how you take these medications        Additional Info    Begin Date AM Noon PM Bedtime    * furosemide 40 MG tablet   Commonly known as:  LASIX   Quantity:  30 tablet   Refills:  0   Dose:  40 mg   What changed:  Another medication with the same name was changed. Make sure you understand how and when to take each.    Instructions:  Take 1 tablet (40 mg total) by mouth daily as needed.                            * furosemide 20 MG tablet   Commonly known as:  LASIX   Refills:  0   Dose:  40 mg   What changed:  how much to take    Instructions:  Take 2 tablets (40 mg total) by mouth 2 (two) times daily.                               lisinopril 5 MG tablet   Commonly known as:  PRINIVIL,ZESTRIL   Quantity:  15 tablet   Refills:  0   Dose:  2.5 mg   What changed:  how much to take    Instructions:  Take 0.5 tablets (2.5 mg total) by mouth once daily.                               * Notice:  This list has 2 medication(s) that are the same as other medications prescribed for you. Read the directions carefully, and ask your doctor or other care provider to review them with you.      CONTINUE taking these medications        Additional Info    Begin Date AM Noon PM Bedtime    apixaban 5 mg Tab   Quantity:  60 tablet   Refills:  5    Last time this was given:  5 mg on 3/22/2017   9:43 AM   Instructions:  1 tab po bid                               atorvastatin 40 MG tablet   Commonly known as:  LIPITOR   Quantity:  30 tablet   Refills:  1   Dose:  40 mg    Last time this was given:  40 mg on 3/22/2017  9:43 AM   Instructions:  Take 1 tablet (40 mg total) by mouth once daily.                               carvedilol 25 MG tablet   Commonly known as:  COREG   Refills:  0   Dose:  12.5 mg    Last time this was given:  12.5 mg on 3/22/2017 10:00 AM   Instructions:  Take 12.5 mg by mouth 2 (two) times daily with meals.                               insulin NPH-insulin regular (70/30) 100 unit/mL (70-30) injection   Commonly known as:  NOVOLIN 70/30   Refills:  0   Dose:  30 Units    Instructions:  Inject 30 Units into the skin once daily.                            potassium chloride SA 20 MEQ tablet   Commonly known as:  K-DURKLOR-CON   Refills:  0   Dose:  20 mEq    Instructions:  Take 20 mEq by mouth once daily.                                 STOP taking these medications     metformin 1000 MG tablet   Commonly known as:  GLUCOPHAGE            Where to Get Your Medications      You can get these medications from any pharmacy     Bring a paper prescription for each of these medications     glipiZIDE 5 MG Tr24       You don't need a prescription for these medications     acetaminophen 325 MG tablet         Information about where to get these medications is not yet available     ! Ask your nurse or doctor about these medications     furosemide 20 MG tablet    lisinopril 5 MG tablet                  Please bring to all follow up appointments:    1. A copy of your discharge instructions.  2. All medicines you are currently taking in their original bottles.  3. Identification and insurance card.    Please arrive 15 minutes ahead of scheduled appointment time.    Please call 24 hours in advance if you must reschedule your appointment and/or time.        Your Scheduled Appointments     Mar 29, 2017   4:00 PM CDT   Hospital Follow Up with Vipul Fishman MD   [address] [address] [phone]            May 22, 2017  1:00 PM CDT   New Patient with Arash Marin MD   [facility]    [address]  [facility] [address]   [phone]            Jun 09, 2017  8:40 AM CDT   Fasting Lab with LAB, [address] [address] [phone]            Jun 16, 2017  1:20 PM CDT   Established Patient Visit with COLIN Puckett   [address] [address] [phone]              Follow-up Information     Follow up with Vipul Fishman MD In 1 week.    Specialties:  Family Medicine, Internal Medicine    Why:  Take blood sugar before meals and at bedtime everyday and keep log for follow up with PCP    Contact information:    [address] [address] [address]  [phone]          Discharge Instructions     Future Orders    Activity as tolerated     Call MD for:  increased confusion or weakness     Call MD for:  persistent dizziness, light-headedness, or visual disturbances     Call MD for:     Comments:    Any decline in condition    Diet general     Comments:    1800 ADA    Questions:    Total calories:      Fat restriction, if any:      Protein restriction, if any:      Na restriction, if any:      Fluid restriction:      Additional restrictions:          Primary Diagnosis     Your primary diagnosis was:  Infection In Bloodstream      Admission Information     Date & Time Provider Department CSN    3/20/2017 11:10 PM Colby Summers MD [facility] [id number]      Care Providers     Provider Role Specialty Primary office phone    Colby Summers MD Attending Provider Hospitalist [phone]      Important Medicare Message          Most  "Recent Value    Important Message from Medicare Regarding Discharge Appeal Rights  Explained to patient/caregiver, Signed/date by patient/caregiver yes 03/22/2017 1000      Your Vitals Were     BP Pulse Temp Resp Height Weight    138/86 (BP Location: Right arm, Patient Position: Lying, BP Method: Automatic) 61 97.9 °F (36.6 °C) (Oral) 18 6' 2" (1.88 m) 113.4 kg (250 lb)    SpO2 BMI             96% 32.1 kg/m2         Recent Lab Values        3/13/2017                           2:34 AM           A1C 8.5 (H)           Comment for A1C at  2:34 AM on 3/13/2017:  According to ADA guidelines, hemoglobin A1C <7.0% represents  optimal control in non-pregnant diabetic patients.  Different  metrics may apply to specific populations.   Standards of Medical Care in Diabetes - 2016.  For the purpose of screening for the presence of diabetes:  <5.7%     Consistent with the absence of diabetes  5.7-6.4%  Consistent with increasing risk for diabetes   (prediabetes)  >or=6.5%  Consistent with diabetes  Currently no consensus exists for use of hemoglobin A1C  for diagnosis of diabetes for children.        Pending Labs     Order Current Status    Blood culture Preliminary result    Blood culture Preliminary result      Allergies as of 3/22/2017     No Known Allergies      Advance Directives     An advance directive is a document which, in the event you are no longer able to make decisions for yourself, tells your healthcare team what kind of treatment you do or do not want to receive, or who you would like to make those decisions for you.  If you do not currently have an advance directive, [facility] encourages you to create one.  For more information call:  [phone] ([phone]), [phone] ([phone]),  or log on to [email or web address].        Smoking Cessation     If you would like to quit smoking:   You may be eligible for free services if you are a [place] resident and started smoking cigarettes before September 1, " 1988.  Call the Smoking Cessation Trust (SCT) toll free at (573) 596-8743 or (363) 609-9282.   Call 1-800-QUIT-NOW if you do not meet the above criteria.            Language Assistance Services     ATTENTION: Language assistance services are available, free of charge. Please call [phone].      ATENCIÓN: Si habla bari, tiene a maya disposición servicios gratuitos de asistencia lingüística. Llame al [phone].     CHÚ Ý: N?u b?n nói Ti?ng Vi?t, có các d?ch v? h? tr? ngôn ng? mi?n phí dành cho b?n. G?i s? [phone].        Heart Failure Education       Heart Failure: Being Active  You have a condition called heart failure. Being active doesnt mean that you have to wear yourself out. Even a little movement each day helps to strengthen your heart. If you cant get out to exercise, you can do simple stretching and strengthening exercises at home. These are good ways to keep you well-conditioned and prevent you and your heart from becoming excessively weak.    Ideas to get you started  · Add a little movement to things you do now. Walk to mail letters. Park your car at the far end of the parking lot and walk to the store. Walk up a flight of stairs instead of taking the elevator.  · Choose activities you enjoy. You might walk, swim, or ride an exercise bike. Things like gardening and washing the car count, too. Other possibilities include: washing dishes, walking the dog, walking around the mall, and doing aerobic activities with friends.  · Join a group exercise program at a [facility] or [facility], a senior center, or a community center. Or look into a hospital cardiac rehabilitation program. Ask your doctor if you qualify.  Tips to keep you going  · Get up and get dressed each day. Go to a coffee shop and read a newspaper or go somewhere that you'll be in the presence of other active people. Youll feel more like being active.  · Make a plan. Choose one or more activities that you enjoy and that you can easily  do. Then plan to do at least one each day. You might write your plan on a calendar.  · Go with a friend or a group if you like company. This can help you feel supported and stay motivated, too.  · Plan social events that you enjoy. This will keep you mentally engaged as well as physically motivated to do things you find pleasure in.  For your safety  · Talk with your healthcare provider before starting an exercise program.  · Exercise indoors when its too hot or too cold outside, or when the air quality is poor. Try walking at a shopping mall.  · Wear socks and sturdy shoes to maintain your balance and prevent falls.  · Start slowly. Do a few minutes several times a day at first. Increase your time and speed little by little.  · Stop and rest whenever you feel tired or get short of breath.  · Dont push yourself on days when you dont feel well.  Date Last Reviewed: 3/20/2016  © 4811-1433 [employer]. [address] [place] [address], [place] [address]. All rights reserved. This information is not intended as a substitute for professional medical care. Always follow your healthcare professional's instructions.              Heart Failure: Evaluating Your Heart  You have a condition called heart failure. To evaluate your condition, your doctor will examine you, ask questions, and do some tests. Along with looking for signs of heart failure, the doctor looks for any other health problems that may have led to heart failure. The results of your evaluation will help your doctor form a treatment plan.  Health history and physical exam  Your visit will start with a health history. Tell the doctor about any symptoms youve noticed and about all medicines you take. Then youll have a physical exam. This includes listening to your heartbeat and breathing. Youll also be checked for swelling (edema) in your legs and neck. When you have fluid buildup or fluid in the lungs, it may be called congestive heart  failure.  Diagnosing heart failure     During an echocardiogram, sound waves bounce off the heart. These are converted into a picture on the screen.   The following may be done to help your doctor form a diagnosis:  · X-rays show the size and shape of your heart. These pictures can also show fluid in your lungs.  · An electrocardiogram (ECG or EKG) shows the pattern of your heartbeat. Small pads (electrodes) are placed on your chest, arms, and legs. Wires connect the pads to the ECG machine, which records your hearts electrical signals. This can give the doctor information about heart function.  · An echocardiogram uses ultrasound waves to show the structure and movement of your heart muscle. This shows how well the heart pumps. It also shows the thickness of the heart walls, and if the heart is enlarged. It is one of the most useful, non-invasive tests as it provides information about the heart's general function. This helps your doctor make treatment decisions.  · Lab tests evaluate small amounts of blood or urine for signs of problems. A BNP lab test can help diagnose and evaluate heart failure. BNP stands for B-type natriuretic peptide. The ventricles secrete more BNP when heart failure worsens. Lab tests can also provide information about metabolic dysfunction or heart dysfunction.  Your treatment plan  Based on the results of your evaluation and tests, your doctor will develop a treatment plan. This plan is designed to relieve some of your heart failure symptoms and help make you more comfortable. Your treatment plan may include:  · Medicine to help your heart work better and improve your quality of life  · Changes in what you eat and drink to help prevent fluid from backing up in your body  · Daily monitoring of your weight and heart failure symptoms to see how well your treatment plan is working  · Exercise to help you stay healthy  · Help with quitting smoking  · Emotional and psychological support to help  adjust to the changes  · Referrals to other specialists to make sure you are being treated comprehensively  Date Last Reviewed: 3/21/2016  © 5651-5880 [employer]. [address] [place] [address], [place] [address]. All rights reserved. This information is not intended as a substitute for professional medical care. Always follow your healthcare professional's instructions.              Heart Failure: Making Changes to Your Diet  You have a condition called heart failure. When you have heart failure, excess fluid is more likely to build up in your body because your heart isn't working well. This makes the heart work harder to pump blood. Fluid buildup causes symptoms such as shortness of breath and swelling (edema). This is often referred to as congestive heart failure or CHF. Controlling the amount of salt (sodium) you eat may help stop fluid from building up. Your doctor may also tell you to reduce the amount of fluid you drink.  Reading food labels    Your healthcare provider will tell you how much sodium you can eat each day. Read food labels to keep track. Keep in mind that certain foods are high in salt. These include canned, frozen, and processed foods. Check the amount of sodium in each serving. Watch out for high-sodium ingredients. These include MSG (monosodium glutamate), baking soda, and sodium phosphate.   Eating less salt  Give yourself time to get used to eating less salt. It may take a little while. Here are some tips to help:  · Take the saltshaker off the table. Replace it with salt-free herb mixes and spices.  · Eat fresh or plain frozen vegetables. These have much less salt than canned vegetables.  · Choose low-sodium snacks like sodium-free pretzels, crackers, or air-popped popcorn.  · Dont add salt to your food when youre cooking. Instead, season your foods with pepper, lemon, garlic, or onion.  · When you eat out, ask that your food be cooked without added salt.  · Avoid eating fried  foods as these often have a great deal of salt.  If youre told to limit fluids  You may need to limit how much fluid you have to help prevent swelling. This includes anything that is liquid at room temperature, such as ice cream and soup. If your doctor tells you to limit fluid, try these tips:  · Measure drinks in a measuring cup before you drink them. This will help you meet daily goals.  · Chill drinks to make them more refreshing.  · Suck on frozen lemon wedges to quench thirst.  · Only drink when youre thirsty.  · Chew sugarless gum or suck on hard candy to keep your mouth moist.  · Weigh yourself daily to know if your body's fluid content is rising.  My sodium goal  Your healthcare provider may give you a sodium goal to meet each day. This includes sodium found in food as well as salt that you add. My goal is to eat no more than ___________ mg of sodium per day.     When to call your doctor  Call your doctor right away if you have any symptoms of worsening heart failure. These can include:  · Sudden weight gain  · Increased swelling of your legs or ankles  · Trouble breathing when youre resting or at night  · Increase in the number of pillows you have to sleep on  · Chest pain, pressure, discomfort, or pain in the jaw, neck, or back   Date Last Reviewed: 3/21/2016  © 2303-4676 [employer]. [address] [place] [address]. All rights reserved. This information is not intended as a substitute for professional medical care. Always follow your healthcare professional's instructions.              Heart Failure: Medicines to Help Your Heart    You have a condition called heart failure (also known as congestive heart failure, or CHF). Your doctor will likely prescribe medicines for heart failure and any underlying health problems you have. Most heart failure patients take one or more types of medicinen. Your healthcare provider will work to find the combination of medicines that works best  for you.  Heart failure medicines  Here are the most common heart failure medicines:  · ACE inhibitors lower blood pressure and decrease strain on the heart. This makes it easier for the heart to pump. Angiotensin receptor blockers have similar effects. These are prescribed for some patients instead of ACE inhibitors.  · Beta-blockers relieve stress on the heart. They also improve symptoms. They may also improve the heart's pumping action over time.  · Diuretics (also called water pills) help rid your body of excess water. This can help rid your body of swelling (edema). Having less fluid to pump means your heart doesnt have to work as hard. Some diuretics make your body lose a mineral called potassium. Your doctor will tell you if you need to take supplements or eat more foods high in potassium.  · Digoxin helps your heart pump with more strength. This helps your heart pump more blood with each beat. So, more oxygen-rich blood travels to the rest of the body.  · Aldosterone antagonists help alter hormones and decrease strain on the heart.  · Hydralazine and nitrates are two separate medicines used together to treat heart failure. They may come in one combination pill. They lower blood pressure and decrease how hard the heart has to pump.  Medicines for related conditions  Controlling other heart problems helps keep heart failure under control, too. Depending on other heart problems you have, medicines may be prescribed to:  · Lower blood pressure (antihypertensives).  · Lower cholesterol levels (statins).  · Prevent blood clots (anticoagulants or aspirin).  · Keep the heartbeat steady (antiarrhythmics).  Date Last Reviewed: 3/5/2016  © 3953-1734 [employer]. [address] [place] [address], [place] [address]. All rights reserved. This information is not intended as a substitute for professional medical care. Always follow your healthcare professional's instructions.              Heart Failure: Procedures  That May Help    The heart is a muscle that pumps oxygen-rich blood to all parts of the body. When you have heart failure, the heart is not able to pump as well as it should. Blood and fluid may back up into the lungs (congestive heart failure), and some parts of the body dont get enough oxygen-rich blood to work normally. These problems lead to the symptoms of heart failure.     Certain procedures may help the heart pump better in some cases of heart failure. Some procedures are done to treat health problems that may have caused the heart failure such as coronary artery disease or heart rhythm problems. For more serious heart failure, other options are available.  Treating artery and valve problems  If you have coronary artery disease or valve disease, procedures may be done to improve blood flow. This helps the heart pump better, which can improve heart failure symptoms. First, your doctor may do a cardiac catheterization to help detect clogged blood vessels or valve damage. During this procedure, a  thin tube (catheter) in inserted into a blood vessel and guided to the heart. There a dye is injected and a special type of X-ray (angiogram) is taken of the blood vessels. Procedures to open a blocked artery or fix damaged valves can also be done using catheterization.  · Angioplasty uses a balloon-tipped instrument at the end of the catheter. The balloon is inflated to widen the narrowed artery. In many cases, a stent is expanded to further support the narrowed artery. A stent is a metal mesh tube.  · Valve surgery repairs or replacement of faulty valves can also be done during catheterization so blood can flow properly through the chambers of the heart.  Bypass surgery is another option to help treat blocked arteries. It uses a healthy blood vessel from elsewhere in the body. The healthy blood vessel is attached above and below the blocked area so that blood can flow around the blocked artery.  Treating heart  rhythm problems  A device may be placed in the chest to help a weak heart maintain a healthy, heartbeat so the heart can pump more effectively:  · Pacemaker. A pacemaker is an implanted device that regulates your heartbeat electronically. It monitors your heart's rhythm and generates a painless electric impulse that helps the heart beat in a regular rhythm. A pacemaker is programmed to meet your specific heart rhythm needs.  · Biventricular pacing/cardiac resynchronization therapy. A type of pacemaker that paces both pumping chambers of the heart at the same time to coordinate contractions and to improve the heart's function. Some people with heart failure are candidates for this therapy.  · Implantable cardioverter defibrillator. A device similar to a pacemaker that senses when the heart is beating too fast and delivers an electrical shock to convert the fast rhythm to a normal rhythm. This can be a life saving device.  In severe cases  In more serious cases of heart failure when other treatments no longer work, other options may include:  · Ventricular assist devices (VADs). These are mechanical devices used to take over the pumping function for one or both of the heart's ventricles, or pumping chambers. A VAD may be necessary when heart failure progresses to the point that medicines and other treatments no longer help. In some cases, a VAD may be used as a bridge to transplant.  · Heart transplant. This is replacing the diseased heart with a healthy one from a donor. This is an option for a few people who are very sick. A heart transplant is very serious and not an option for all patients. Your doctor can tell you more.  Date Last Reviewed: 3/20/2016  © 3193-9995 [employer]. [address] [place] [address], [place] [address]. All rights reserved. This information is not intended as a substitute for professional medical care. Always follow your healthcare professional's instructions.              Heart  Failure: Tracking Your Weight  You have a condition called heart failure. When you have heart failure, a sudden weight gain or a steady rise in weight is a warning sign that your body is retaining too much water and salt. This could mean your heart failure is getting worse. If left untreated, it can cause problems for your lungs and result in shortness of breath. Weighing yourself each day is the best way to know if youre retaining water. If your weight goes up quickly, call your doctor. You will be given instructions on how to get rid of the excess water. You will likely need medicines and to avoid salt. This will help your heart work better.  Call your doctor if you gain more than 2 pounds in 1 day, more than 5 pounds in 1 week, or whatever weight gain you were told to report by your doctor. This is often a sign of worsening heart failure and needs to be evaluated and treated. Your doctor will tell you what to do next.   Tips for weighing yourself    · Weigh yourself at the same time each morning, wearing the same clothes. Weigh yourself after urinating and before eating.  · Use the same scale each day. Make sure the numbers are easy to read. Put the scale on a flat, hard surface -- not on a rug or carpet.  · Do not stop weighing yourself. If you forget one day, weigh again the next morning.  How to use your weight chart  · Keep your weight chart near the scale. Write your weight on the chart as soon as you get off the scale.  · Fill in the month and the start date on the chart. Then write down your weight each day. Your chart will look like this:    · If you miss a day, leave the space blank. Weigh yourself the next day and write your weight in the next space.  · Take your weight chart with you when you go to see your doctor.  Date Last Reviewed: 3/20/2016  © 1163-0125 [employer]. [address] [place] [address], [place] [address]. All rights reserved. This information is not intended as a substitute for  professional medical care. Always follow your healthcare professional's instructions.              Heart Failure: Warning Signs of a Flare-Up  You have a condition called heart failure. Once you have heart failure, flare-ups can happen. Below are signs that can mean your heart failure is getting worse. If you notice any of these warning signs, call your healthcare provider.  Swelling    · Your feet, ankles, or lower legs get puffier.  · You notice skin changes on your lower legs.  · Your shoes feel too tight.  · Your clothes are tighter in the waist.  · You have trouble getting rings on or off your fingers.  Shortness of breath  · You have to breathe harder even when youre doing your normal activities or when youre resting.  · You are short of breath walking up stairs or even short distances.  · You wake up at night short of breath or coughing.  · You need to use more pillows or sit up to sleep.  · You wake up tired or restless.  Other warning signs  · You feel weaker, dizzy, or more tired.  · You have chest pain or changes in your heartbeat.  · You have a cough that wont go away.  · You cant remember things or dont feel like eating.  Tracking your weight  Gaining weight is often the first warning sign that heart failure is getting worse. Gaining even a few pounds can be a sign that your body is retaining excess water and salt. Weighing yourself each day in the morning after you urinate and before you eat, is the best way to know if you're retaining water. Get a scale that is easy to read and make sure you wear the same clothes and use the same scale every time you weigh. Your healthcare provider will show you how to track your weight. Call your doctor if you gain more than 2 pounds in 1 day, 5 pounds in 1 week, or whatever weight gain you were told to report by your doctor. This is often a sign of worsening heart failure and needs to be evaluated and treated before it compromises your breathing. Your doctor  will tell you what to do next.    Date Last Reviewed: 3/15/2016  © 3399-6585 [employer]. [address] [place] [address], [place] [address]. All rights reserved. This information is not intended as a substitute for professional medical care. Always follow your healthcare professional's instructions.              Chronic Kindey Disease Education             Diabetes Discharge Instructions                                   Eliquis Informaiton           [facility] Sign-Up     Activating your [facility] account is as easy as 1-2-3!     1) Visit [email or web address], select Sign Up Now, enter this activation code and your date of birth, then select Next.  [email or web address]  Expires: 4/19/2017  2:37 PM      2) Create a username and password to use when you visit [facility] in the future and select a security question in case you lose your password and select Next.    3) Enter your e-mail address and click Sign Up!    Additional Information  If you have questions, please e-mail [email or web address] or call [phone] to talk to our [facility] staff. Remember, [facility] is NOT to be used for urgent needs. For medical emergencies, dial 911.          [facility] complies with applicable Federal civil rights laws and does not discriminate on the basis of race, color, national origin, age, disability, or sex.

## 2017-03-21 PROBLEM — A41.9 SEPSIS: Status: ACTIVE | Noted: 2017-03-21

## 2017-03-21 LAB
ANION GAP SERPL CALC-SCNC: 13 MMOL/L
BASOPHILS # BLD AUTO: 0 K/UL
BASOPHILS NFR BLD: 0.1 %
BILIRUB UR QL STRIP: NEGATIVE
BUN SERPL-MCNC: 23 MG/DL
CALCIUM SERPL-MCNC: 9.1 MG/DL
CHLORIDE SERPL-SCNC: 104 MMOL/L
CLARITY UR: CLEAR
CO2 SERPL-SCNC: 21 MMOL/L
COLOR UR: YELLOW
CREAT SERPL-MCNC: 1.5 MG/DL
DIFFERENTIAL METHOD: ABNORMAL
EOSINOPHIL # BLD AUTO: 0.1 K/UL
EOSINOPHIL NFR BLD: 0.7 %
ERYTHROCYTE [DISTWIDTH] IN BLOOD BY AUTOMATED COUNT: 14.9 %
EST. GFR  (AFRICAN AMERICAN): 56 ML/MIN/1.73 M^2
EST. GFR  (NON AFRICAN AMERICAN): 49 ML/MIN/1.73 M^2
GLUCOSE SERPL-MCNC: 192 MG/DL
GLUCOSE UR QL STRIP: ABNORMAL
HCT VFR BLD AUTO: 50.4 %
HGB BLD-MCNC: 16.3 G/DL
HGB UR QL STRIP: ABNORMAL
INR PPP: 1.1
KETONES UR QL STRIP: ABNORMAL
LACTATE SERPL-SCNC: 1.7 MMOL/L
LACTATE SERPL-SCNC: 1.8 MMOL/L
LACTATE SERPL-SCNC: 1.9 MMOL/L
LACTATE SERPL-SCNC: 3.2 MMOL/L
LACTATE SERPL-SCNC: 3.3 MMOL/L
LEUKOCYTE ESTERASE UR QL STRIP: NEGATIVE
LYMPHOCYTES # BLD AUTO: 1.7 K/UL
LYMPHOCYTES NFR BLD: 11.7 %
MCH RBC QN AUTO: 29.4 PG
MCHC RBC AUTO-ENTMCNC: 32.4 %
MCV RBC AUTO: 91 FL
MONOCYTES # BLD AUTO: 0.5 K/UL
MONOCYTES NFR BLD: 3.1 %
NEUTROPHILS # BLD AUTO: 12.3 K/UL
NEUTROPHILS NFR BLD: 84.4 %
NITRITE UR QL STRIP: NEGATIVE
PH UR STRIP: 5 [PH] (ref 5–8)
PLATELET # BLD AUTO: 192 K/UL
PMV BLD AUTO: 9.4 FL
POCT GLUCOSE: 143 MG/DL (ref 70–110)
POCT GLUCOSE: 150 MG/DL (ref 70–110)
POCT GLUCOSE: 152 MG/DL (ref 70–110)
POCT GLUCOSE: 166 MG/DL (ref 70–110)
POCT GLUCOSE: 239 MG/DL (ref 70–110)
POTASSIUM SERPL-SCNC: 4.6 MMOL/L
PROT UR QL STRIP: ABNORMAL
PROTHROMBIN TIME: 11.4 SEC
RBC # BLD AUTO: 5.56 M/UL
SODIUM SERPL-SCNC: 138 MMOL/L
SP GR UR STRIP: 1.02 (ref 1–1.03)
TROPONIN I SERPL DL<=0.01 NG/ML-MCNC: 0.02 NG/ML
URN SPEC COLLECT METH UR: ABNORMAL
UROBILINOGEN UR STRIP-ACNC: NEGATIVE EU/DL
WBC # BLD AUTO: 14.5 K/UL

## 2017-03-21 PROCEDURE — 87040 BLOOD CULTURE FOR BACTERIA: CPT | Mod: 59

## 2017-03-21 PROCEDURE — 84484 ASSAY OF TROPONIN QUANT: CPT

## 2017-03-21 PROCEDURE — 36415 COLL VENOUS BLD VENIPUNCTURE: CPT

## 2017-03-21 PROCEDURE — 85610 PROTHROMBIN TIME: CPT

## 2017-03-21 PROCEDURE — 83605 ASSAY OF LACTIC ACID: CPT

## 2017-03-21 PROCEDURE — 80048 BASIC METABOLIC PNL TOTAL CA: CPT

## 2017-03-21 PROCEDURE — 63600175 PHARM REV CODE 636 W HCPCS: Performed by: EMERGENCY MEDICINE

## 2017-03-21 PROCEDURE — 85025 COMPLETE CBC W/AUTO DIFF WBC: CPT

## 2017-03-21 PROCEDURE — 63600175 PHARM REV CODE 636 W HCPCS: Performed by: PHYSICIAN ASSISTANT

## 2017-03-21 PROCEDURE — 83605 ASSAY OF LACTIC ACID: CPT | Mod: 91

## 2017-03-21 PROCEDURE — 25000003 PHARM REV CODE 250: Performed by: EMERGENCY MEDICINE

## 2017-03-21 PROCEDURE — 82962 GLUCOSE BLOOD TEST: CPT

## 2017-03-21 PROCEDURE — 81003 URINALYSIS AUTO W/O SCOPE: CPT

## 2017-03-21 PROCEDURE — 12000002 HC ACUTE/MED SURGE SEMI-PRIVATE ROOM

## 2017-03-21 PROCEDURE — 25000003 PHARM REV CODE 250: Performed by: NURSE PRACTITIONER

## 2017-03-21 PROCEDURE — 25000003 PHARM REV CODE 250: Performed by: PHYSICIAN ASSISTANT

## 2017-03-21 RX ORDER — IBUPROFEN 200 MG
16 TABLET ORAL
Status: DISCONTINUED | OUTPATIENT
Start: 2017-03-21 | End: 2017-03-22 | Stop reason: HOSPADM

## 2017-03-21 RX ORDER — ATORVASTATIN CALCIUM 40 MG/1
40 TABLET, FILM COATED ORAL DAILY
Status: DISCONTINUED | OUTPATIENT
Start: 2017-03-21 | End: 2017-03-22 | Stop reason: HOSPADM

## 2017-03-21 RX ORDER — AMOXICILLIN 250 MG
1 CAPSULE ORAL 2 TIMES DAILY PRN
Status: DISCONTINUED | OUTPATIENT
Start: 2017-03-21 | End: 2017-03-22 | Stop reason: HOSPADM

## 2017-03-21 RX ORDER — ONDANSETRON 2 MG/ML
4 INJECTION INTRAMUSCULAR; INTRAVENOUS EVERY 6 HOURS PRN
Status: DISCONTINUED | OUTPATIENT
Start: 2017-03-21 | End: 2017-03-22 | Stop reason: HOSPADM

## 2017-03-21 RX ORDER — GLUCAGON 1 MG
1 KIT INJECTION
Status: DISCONTINUED | OUTPATIENT
Start: 2017-03-21 | End: 2017-03-22 | Stop reason: HOSPADM

## 2017-03-21 RX ORDER — PANTOPRAZOLE SODIUM 40 MG/1
40 TABLET, DELAYED RELEASE ORAL DAILY
Status: DISCONTINUED | OUTPATIENT
Start: 2017-03-21 | End: 2017-03-22 | Stop reason: HOSPADM

## 2017-03-21 RX ORDER — POTASSIUM CHLORIDE 20 MEQ/15ML
60 SOLUTION ORAL
Status: DISCONTINUED | OUTPATIENT
Start: 2017-03-21 | End: 2017-03-22 | Stop reason: HOSPADM

## 2017-03-21 RX ORDER — IBUPROFEN 200 MG
24 TABLET ORAL
Status: DISCONTINUED | OUTPATIENT
Start: 2017-03-21 | End: 2017-03-22 | Stop reason: HOSPADM

## 2017-03-21 RX ORDER — POTASSIUM CHLORIDE 20 MEQ/15ML
40 SOLUTION ORAL
Status: DISCONTINUED | OUTPATIENT
Start: 2017-03-21 | End: 2017-03-22 | Stop reason: HOSPADM

## 2017-03-21 RX ORDER — SODIUM CHLORIDE 9 MG/ML
1000 INJECTION, SOLUTION INTRAVENOUS CONTINUOUS
Status: ACTIVE | OUTPATIENT
Start: 2017-03-21 | End: 2017-03-21

## 2017-03-21 RX ORDER — ACETAMINOPHEN 325 MG/1
650 TABLET ORAL EVERY 6 HOURS PRN
Status: DISCONTINUED | OUTPATIENT
Start: 2017-03-21 | End: 2017-03-22 | Stop reason: HOSPADM

## 2017-03-21 RX ORDER — INSULIN ASPART 100 [IU]/ML
0-5 INJECTION, SOLUTION INTRAVENOUS; SUBCUTANEOUS
Status: DISCONTINUED | OUTPATIENT
Start: 2017-03-21 | End: 2017-03-22 | Stop reason: HOSPADM

## 2017-03-21 RX ORDER — CARVEDILOL 6.25 MG/1
12.5 TABLET ORAL 2 TIMES DAILY WITH MEALS
Status: DISCONTINUED | OUTPATIENT
Start: 2017-03-21 | End: 2017-03-22 | Stop reason: HOSPADM

## 2017-03-21 RX ADMIN — CARVEDILOL 12.5 MG: 6.25 TABLET, FILM COATED ORAL at 05:03

## 2017-03-21 RX ADMIN — APIXABAN 5 MG: 2.5 TABLET, FILM COATED ORAL at 08:03

## 2017-03-21 RX ADMIN — CARVEDILOL 12.5 MG: 6.25 TABLET, FILM COATED ORAL at 10:03

## 2017-03-21 RX ADMIN — SODIUM CHLORIDE 1000 ML: 0.9 INJECTION, SOLUTION INTRAVENOUS at 02:03

## 2017-03-21 RX ADMIN — APIXABAN 5 MG: 2.5 TABLET, FILM COATED ORAL at 09:03

## 2017-03-21 RX ADMIN — VANCOMYCIN HYDROCHLORIDE 1500 MG: 1 INJECTION, POWDER, LYOPHILIZED, FOR SOLUTION INTRAVENOUS at 02:03

## 2017-03-21 RX ADMIN — PIPERACILLIN AND TAZOBACTAM 3.38 G: 3; .375 INJECTION, POWDER, LYOPHILIZED, FOR SOLUTION INTRAVENOUS; PARENTERAL at 02:03

## 2017-03-21 RX ADMIN — PANTOPRAZOLE SODIUM 40 MG: 40 TABLET, DELAYED RELEASE ORAL at 08:03

## 2017-03-21 RX ADMIN — INSULIN DETEMIR 25 UNITS: 100 INJECTION, SOLUTION SUBCUTANEOUS at 08:03

## 2017-03-21 RX ADMIN — ATORVASTATIN CALCIUM 40 MG: 40 TABLET, FILM COATED ORAL at 08:03

## 2017-03-21 RX ADMIN — SODIUM CHLORIDE 1000 ML: 0.9 INJECTION, SOLUTION INTRAVENOUS at 01:03

## 2017-03-21 RX ADMIN — PIPERACILLIN SODIUM AND TAZOBACTAM SODIUM 4.5 G: 4; .5 INJECTION, POWDER, FOR SOLUTION INTRAVENOUS at 09:03

## 2017-03-21 NOTE — ASSESSMENT & PLAN NOTE
Broad-specturm IV antibiotcs: Vancomycin, Zosyn  Follow up urine and blood cultures  IVF  Hold antihypertensives for now  Trend lactate  Trend WBC, temperature  No signs of infection Will DC IV antibiotics and monitor.

## 2017-03-21 NOTE — PLAN OF CARE
Problem: Patient Care Overview  Goal: Plan of Care Review  Outcome: Ongoing (interventions implemented as appropriate)  Pt free from falls or injury. Pt safe, Pt ambulates w/out assistance, Pt denies pain. Pts BG monitored. Pt denies N/V. Pt cooperative. Pt verbalized understanding plan of care. Wheels locked, bed in low position, call light in reach.

## 2017-03-21 NOTE — ED PROVIDER NOTES
"Encounter Date: 3/20/2017    SCRIBE #1 NOTE: I, Aby menezes, am scribing for, and in the presence of, Dr Hicks.       History     Chief Complaint   Patient presents with    Weakness     Since 2100 tonight Generalized.  Mild hypotension per EMS.  GLU 71 inital Per EMS.  191 after 250 D5W     Review of patient's allergies indicates:  No Known Allergies  HPI Comments: 03/20/2017  11:26 PM     Chief Complaint: Weakness      Earnset Wallis is a 61 y.o. male presenting to the E.D. with an acute onset of generalized weakness which has been ongoing since 2100 tonight. Per EMS, pt also mildly hypotensive. Pt reports hypoglycemic episode of 70 prior to arrival tonight which was associated with weakness. Prior to episode he experienced diaphoresis and states he was "dripping water." He ate piece of cake to assist in raising blood sugar which did not alleviate symptoms of weakness. Yesterday he experienced one episdoe of diarrhea but none today. Denies abdominal pain, fever. Pmhx of Cancer; DM; HTN; and MI, old (2014).  Pt has recent admission for stroke like symptoms. Pt has a past surgical history that includes Cardiac pacemaker placement (2014); Cardiac pacemaker placement; Eye surgery (Bilateral); Tonsillectomy; and Shoulder surgery (Left).      The history is provided by the patient.     Past Medical History:   Diagnosis Date    Cancer     skin cancer left shoulder    Diabetes mellitus     Hypertension     MI, old 2014     Past Surgical History:   Procedure Laterality Date    CARDIAC PACEMAKER PLACEMENT  2014    CARDIAC PACEMAKER PLACEMENT      EYE SURGERY Bilateral     laser corrective surgery    SHOULDER SURGERY Left     TONSILLECTOMY       History reviewed. No pertinent family history.  Social History   Substance Use Topics    Smoking status: Current Every Day Smoker     Packs/day: 0.50     Types: Cigarettes    Smokeless tobacco: None    Alcohol use No     Review of Systems   Constitutional: " Negative for fever.   HENT: Negative.  Negative for sore throat.    Eyes: Negative for visual disturbance.   Respiratory: Negative for cough.    Cardiovascular: Negative for chest pain.   Gastrointestinal: Negative for abdominal pain, diarrhea, nausea and vomiting.   Genitourinary: Negative for difficulty urinating.   Musculoskeletal: Negative for arthralgias.   Skin: Negative for rash.   Neurological: Positive for weakness (generalized).       Physical Exam   Initial Vitals   BP Pulse Resp Temp SpO2   03/20/17 2317 03/20/17 2317 -- 03/20/17 2321 03/20/17 2317   84/54 64  96.3 °F (35.7 °C) 92 %     Physical Exam    Nursing note and vitals reviewed.  Constitutional: He appears well-developed and well-nourished.   HENT:   Head: Normocephalic and atraumatic.   Mouth/Throat: Oropharynx is clear and moist.   Eyes: Conjunctivae are normal. Pupils are equal, round, and reactive to light.   Neck: Neck supple.   Cardiovascular: Normal rate, regular rhythm, normal heart sounds and intact distal pulses. Exam reveals no gallop and no friction rub.    No murmur heard.  Pulmonary/Chest: Breath sounds normal. He has no wheezes. He has no rhonchi. He has no rales.   Abdominal: Soft. He exhibits no distension. There is no tenderness.   Musculoskeletal: Normal range of motion.   Trace pitting pedal edema.    Neurological: He is alert and oriented to person, place, and time.   CN II-XII in tact. 5/5 strength and sensation.    Skin: No rash noted. No erythema.   Psychiatric: He has a normal mood and affect.         ED Course   Critical Care  Date/Time: 3/21/2017 4:34 AM  Performed by: CHET MCGRATH  Authorized by: KAREEN NAVA   Direct patient critical care time: 16 minutes  Additional history critical care time: 5 minutes  Ordering / reviewing critical care time: 5 minutes  Documentation critical care time: 4 minutes  Consulting other physicians critical care time: 3 minutes  Total critical care time (exclusive of  procedural time) : 33 minutes        Labs Reviewed   CBC W/ AUTO DIFFERENTIAL - Abnormal; Notable for the following:        Result Value    WBC 14.50 (*)     RDW 14.9 (*)     Gran # 12.3 (*)     Gran% 84.4 (*)     Lymph% 11.7 (*)     Mono% 3.1 (*)     All other components within normal limits   BASIC METABOLIC PANEL - Abnormal; Notable for the following:     CO2 21 (*)     Glucose 192 (*)     Creatinine 1.5 (*)     eGFR if  56 (*)     eGFR if non  49 (*)     All other components within normal limits   LACTIC ACID, PLASMA - Abnormal; Notable for the following:     Lactate (Lactic Acid) 3.2 (*)     All other components within normal limits   URINALYSIS - Abnormal; Notable for the following:     Protein, UA Trace (*)     Glucose, UA 1+ (*)     Ketones, UA Trace (*)     Occult Blood UA Trace (*)     All other components within normal limits   POCT GLUCOSE - Abnormal; Notable for the following:     POCT Glucose 184 (*)     All other components within normal limits   POCT GLUCOSE - Abnormal; Notable for the following:     POCT Glucose 239 (*)     All other components within normal limits   CULTURE, BLOOD   CULTURE, BLOOD   PROTIME-INR   TROPONIN I                        Scribe Attestation:   Scribe #1: I performed the above scribed service and the documentation accurately describes the services I performed. I attest to the accuracy of the note.    Attending Attestation:           Physician Attestation for Scribe:  Physician Attestation Statement for Scribe #1: I, Dr Hicks, reviewed documentation, as scribed by Aby Nuno in my presence, and it is both accurate and complete.         Earnest Wallis is a 61 y.o. male presenting with hypotension with generalized weakness of unclear etiology.  Patient was given prehospital D50 but was never documented as hypoglycemic with symptoms not improving despite dextrose therapy.  Laboratories reviewed with no other clear etiology of  patient's symptoms.  He did have hypotension rapidly responding to IV fluids.  He does have an intermediate lactic acid with no clear sign of infection at present.  Cultures have been drawn.  I have spoken with hospitalist service request initial empiric IV antibiotic in case of sepsis.  Repeat lactic acid is pending at the time of admission to be followed by admitting team.  Further IV fluids with observation admission to monitored bed given earlier hypotension.  No other obvious etiology of hypotension with normal paced rhythm.  Low suspicion for ACS.  I doubt PE or aortic dissection.  No significant anemia is present.  Patient updated on current workup and plan of care with admission and observation.            ED Course   Comment By Time   EKG:  AV pacer, rate of 61, wide QRS.  No significant change compared to prior.  Appropriate discordance.  No sign of acute ischemia, infarction, or arrhythmia. Alvaro Hicks MD 03/20 5316   CXR:  NAD, cardiomegaly present. (my read) Alvaro Hicks MD 03/21 0005     Clinical Impression:   The primary encounter diagnosis was Hypotension, unspecified hypotension type. A diagnosis of Sepsis was also pertinent to this visit.          Alvaro Hicks MD  03/21/17 4144

## 2017-03-21 NOTE — H&P
[facility] Hospital Medicine  History & Physical    Patient Name: Earnest Wallis  MRN: [id number]  Admission Date: 3/20/2017  Attending Physician: Colby Summers MD   Primary Care Provider: Vipul Fishman MD         Patient information was obtained from patient, past medical records and ER records.     Subjective:     Principal Problem:Sepsis    Chief Complaint:   Chief Complaint   Patient presents with    Weakness     Since 2100 tonight Generalized.  Mild hypotension per EMS.  GLU 71 inital Per EMS.  191 after 250 D5W        HPI: Mr. Wallis presents for evaluation of weakness. He reports experiencing generalized weakness and diaphoresis. He reportedly was hypoglycemic and hypotensive. He denies experiencing fever, chills, cough, worsening dyspnea, dysuria, vomiting, diarrhea. He reports chronic nasal congestion, sinusitis. He was evaluated in the ED and found to be hypotensives. He received IVF and was started on IV antibiotics.    Past Medical History:   Diagnosis Date    Cancer     skin cancer left shoulder    Diabetes mellitus     Hypertension     MI, old 2014       Past Surgical History:   Procedure Laterality Date    CARDIAC PACEMAKER PLACEMENT  2014    CARDIAC PACEMAKER PLACEMENT      EYE SURGERY Bilateral     laser corrective surgery    SHOULDER SURGERY Left     TONSILLECTOMY         Review of patient's allergies indicates:  No Known Allergies    No current facility-administered medications on file prior to encounter.      Current Outpatient Prescriptions on File Prior to Encounter   Medication Sig    apixaban 5 mg Tab 1 tab po bid    atorvastatin (LIPITOR) 40 MG tablet Take 1 tablet (40 mg total) by mouth once daily.    carvedilol (COREG) 25 MG tablet Take 12.5 mg by mouth 2 (two) times daily with meals.    furosemide (LASIX) 20 MG tablet Take 60 mg by mouth 2 (two) times daily.    furosemide (LASIX) 40 MG tablet Take 1 tablet (40 mg total) by mouth daily as  needed.    insulin NPH-insulin regular, 70/30, (NOVOLIN 70/30) 100 unit/mL (70-30) injection Inject 30 Units into the skin once daily.    lisinopril (PRINIVIL,ZESTRIL) 5 MG tablet Take 1 tablet (5 mg total) by mouth once daily.    metformin (GLUCOPHAGE) 1000 MG tablet Take 1,000 mg by mouth 2 (two) times daily with meals.    potassium chloride SA (K-DUR,KLOR-CON) 20 MEQ tablet Take 20 mEq by mouth once daily.     Family History     None        Social History Main Topics    Smoking status: Current Every Day Smoker     Packs/day: 0.50     Types: Cigarettes    Smokeless tobacco: Not on file    Alcohol use No    Drug use: No    Sexual activity: Not on file     Review of Systems   Constitutional: Negative for chills and fever.   HENT: Positive for congestion. Negative for sore throat.    Eyes: Negative for photophobia and visual disturbance.   Respiratory: Negative for cough and shortness of breath.    Cardiovascular: Negative for chest pain and leg swelling.   Gastrointestinal: Negative for diarrhea, nausea and vomiting.   Genitourinary: Negative for dysuria and urgency.   Musculoskeletal: Negative for neck pain and neck stiffness.   Skin: Negative for rash and wound.   Neurological: Positive for weakness. Negative for syncope.   Psychiatric/Behavioral: Negative for dysphoric mood. The patient is not nervous/anxious.      Objective:     Vital Signs (Most Recent):  Temp: 97.6 °F (36.4 °C) (03/21/17 0705)  Pulse: 61 (03/21/17 0705)  Resp: 18 (03/21/17 0705)  BP: 112/83 (03/21/17 0705)  SpO2: 98 % (03/21/17 0705) Vital Signs (24h Range):  Temp:  [96.3 °F (35.7 °C)-97.8 °F (36.6 °C)] 97.6 °F (36.4 °C)  Pulse:  [60-65] 61  Resp:  [18-20] 18  SpO2:  [91 %-100 %] 98 %  BP: ()/(54-83) 112/83     Weight: 113.4 kg (250 lb)  Body mass index is 32.1 kg/(m^2).    Physical Exam   Constitutional: He is oriented to person, place, and time. He appears well-developed and well-nourished. No distress.   HENT:   Head:  Normocephalic and atraumatic.   Nose: Nose normal.   Oral mucosa dry   Eyes: Right eye exhibits no discharge. Left eye exhibits no discharge. No scleral icterus.   Neck: Neck supple. No JVD present.   Cardiovascular: Normal rate and regular rhythm.    Pulmonary/Chest: Effort normal.   Breath sounds distant throughout.   Musculoskeletal: He exhibits no edema or tenderness.   Neurological: He is alert and oriented to person, place, and time.   Skin: Skin is warm and dry. He is not diaphoretic.   Psychiatric: He has a normal mood and affect. His behavior is normal.   Nursing note and vitals reviewed.       Significant Labs:   CBC:   Recent Labs  Lab 03/21/17  0001   WBC 14.50*   HGB 16.3   HCT 50.4        CMP:   Recent Labs  Lab 03/21/17 0001      K 4.6      CO2 21*   *   BUN 23   CREATININE 1.5*   CALCIUM 9.1   ANIONGAP 13   EGFRNONAA 49*     Lactic Acid: 3.0    Significant Imaging:     X-Ray Chest 1 View [[id number]] Resulted: 03/21/17 0837        Order Status: Completed Updated: 03/21/17 0837       Narrative:         Comparison study: None    One view chest    Heart appears borderline in size.  Cardiac pacemaker is present.  Lungs are clear of infiltrate, and there is no pleural effusion       Impression:             No acute cardio pulmonary disease          Assessment/Plan:     Chronic combined systolic and diastolic CHF (congestive heart failure)  Hold antihypertensives and diuretics for now  Monitor I/O, daily weight      Type 2 diabetes mellitus with complication  Check BG prior to meals and QHS. Administer rapid-acting insulin according to low-dose sliding scale        * Sepsis  Broad-specturm IV antibiotcs: Vancomycin, Zosyn  Follow up urine and blood cultures  IVF  Hold antihypertensives for now  Trend lactate  Trend WBC, temperature      Acute thrombus of left ventricle  Continue Apixaban      Essential hypertension  Hold antihypertensives for now      CKD (chronic kidney disease)  stage 3, GFR 30-59 ml/min  Avoid non-essential nephrotoxins, dose medications according to GFR  Monitor I/O, daily weight. Monitor for volume overload      VTE Risk Mitigation         Ordered     apixaban tablet 5 mg  2 times daily     Route:  Oral        03/21/17 0218     Medium Risk of VTE  Once      03/21/17 0218     Reason for No Pharmacological VTE Prophylaxis  Once      03/21/17 0218        Kay Gerber PA-C  Department of Hospital Medicine   [facility]

## 2017-03-21 NOTE — CONSULTS
Earnest Wallis [id number] is a 61 y.o. male who has been consulted for vancomycin dosing.    The patient has the following labs:     Date Creatinine (mg/dl)    BUN WBC Count   3/21/2017 Estimated Creatinine Clearance: 67.5 mL/min (based on Cr of 1.5). Lab Results   Component Value Date    BUN 23 03/21/2017     Lab Results   Component Value Date    WBC 14.50 (H) 03/21/2017        Current weight is 113.4 kg (250 lb)      The patient received  1500 mg on 3/21 at 0235.    The patient will be started on vancomycin at a dose of 1250 mg every 12 hours.  The vancomycin trough has been ordered for 3/22 at 1400.  Target trough goal is 15 to 20.    Patient will be followed by pharmacy for changes in renal function, toxicity, and efficacy.   Thank you for allowing us to participate in this patient's care.     Kelly Costello

## 2017-03-21 NOTE — SUBJECTIVE & OBJECTIVE
Past Medical History:   Diagnosis Date    Cancer     skin cancer left shoulder    Diabetes mellitus     Hypertension     MI, old 2014       Past Surgical History:   Procedure Laterality Date    CARDIAC PACEMAKER PLACEMENT  2014    CARDIAC PACEMAKER PLACEMENT      EYE SURGERY Bilateral     laser corrective surgery    SHOULDER SURGERY Left     TONSILLECTOMY         Review of patient's allergies indicates:  No Known Allergies    No current facility-administered medications on file prior to encounter.      Current Outpatient Prescriptions on File Prior to Encounter   Medication Sig    apixaban 5 mg Tab 1 tab po bid    atorvastatin (LIPITOR) 40 MG tablet Take 1 tablet (40 mg total) by mouth once daily.    carvedilol (COREG) 25 MG tablet Take 12.5 mg by mouth 2 (two) times daily with meals.    furosemide (LASIX) 20 MG tablet Take 60 mg by mouth 2 (two) times daily.    furosemide (LASIX) 40 MG tablet Take 1 tablet (40 mg total) by mouth daily as needed.    insulin NPH-insulin regular, 70/30, (NOVOLIN 70/30) 100 unit/mL (70-30) injection Inject 30 Units into the skin once daily.    lisinopril (PRINIVIL,ZESTRIL) 5 MG tablet Take 1 tablet (5 mg total) by mouth once daily.    metformin (GLUCOPHAGE) 1000 MG tablet Take 1,000 mg by mouth 2 (two) times daily with meals.    potassium chloride SA (K-DUR,KLOR-CON) 20 MEQ tablet Take 20 mEq by mouth once daily.     Family History     None        Social History Main Topics    Smoking status: Current Every Day Smoker     Packs/day: 0.50     Types: Cigarettes    Smokeless tobacco: Not on file    Alcohol use No    Drug use: No    Sexual activity: Not on file     Review of Systems   Constitutional: Negative for chills and fever.   HENT: Positive for congestion. Negative for sore throat.    Eyes: Negative for photophobia and visual disturbance.   Respiratory: Negative for cough and shortness of breath.    Cardiovascular: Negative for chest pain and leg swelling.    Gastrointestinal: Negative for diarrhea, nausea and vomiting.   Genitourinary: Negative for dysuria and urgency.   Musculoskeletal: Negative for neck pain and neck stiffness.   Skin: Negative for rash and wound.   Neurological: Positive for weakness. Negative for syncope.   Psychiatric/Behavioral: Negative for dysphoric mood. The patient is not nervous/anxious.      Objective:     Vital Signs (Most Recent):  Temp: 97.6 °F (36.4 °C) (03/21/17 0705)  Pulse: 61 (03/21/17 0705)  Resp: 18 (03/21/17 0705)  BP: 112/83 (03/21/17 0705)  SpO2: 98 % (03/21/17 0705) Vital Signs (24h Range):  Temp:  [96.3 °F (35.7 °C)-97.8 °F (36.6 °C)] 97.6 °F (36.4 °C)  Pulse:  [60-65] 61  Resp:  [18-20] 18  SpO2:  [91 %-100 %] 98 %  BP: ()/(54-83) 112/83     Weight: 113.4 kg (250 lb)  Body mass index is 32.1 kg/(m^2).    Physical Exam   Constitutional: He is oriented to person, place, and time. He appears well-developed and well-nourished. No distress.   HENT:   Head: Normocephalic and atraumatic.   Nose: Nose normal.   Oral mucosa dry   Eyes: Right eye exhibits no discharge. Left eye exhibits no discharge. No scleral icterus.   Neck: Neck supple. No JVD present.   Cardiovascular: Normal rate and regular rhythm.    Pulmonary/Chest: Effort normal.   Breath sounds distant throughout.   Musculoskeletal: He exhibits no edema or tenderness.   Neurological: He is alert and oriented to person, place, and time.   Skin: Skin is warm and dry. He is not diaphoretic.   Psychiatric: He has a normal mood and affect. His behavior is normal.   Nursing note and vitals reviewed.       Significant Labs:   CBC:   Recent Labs  Lab 03/21/17  0001   WBC 14.50*   HGB 16.3   HCT 50.4        CMP:   Recent Labs  Lab 03/21/17  0001      K 4.6      CO2 21*   *   BUN 23   CREATININE 1.5*   CALCIUM 9.1   ANIONGAP 13   EGFRNONAA 49*     Lactic Acid: 3.0    Significant Imaging:     X-Ray Chest 1 View [[id number]] Resulted: 03/21/17 0837         Order Status: Completed Updated: 03/21/17 0837       Narrative:         Comparison study: None    One view chest    Heart appears borderline in size.  Cardiac pacemaker is present.  Lungs are clear of infiltrate, and there is no pleural effusion       Impression:             No acute cardio pulmonary disease

## 2017-03-21 NOTE — CONSULTS
Earnest Wallis [id number] is a 61 y.o. male who had been consulted for vancomycin dosing.    Vancomycin has been discontinued.  Pharmacy consult for vancomycin dosing in no longer required.      Thank you for allowing us to participate in this patient's care.     Adriana Rider

## 2017-03-21 NOTE — PLAN OF CARE
03/21/17 1322   Readmission Questionnaire   At the time of your discharge, did someone talk to you about what your health problems were? Yes   At the time of discharge, did someone talk to you about what to watch out for regarding worsening of your health problem? Yes   At the time of discharge, did someone talk to you about what to do if you experienced worsening of your health problem? Yes   At the time of discharge, did someone talk to you about which medication to take when you left the hospital and which ones to stop taking? Yes   At the time of discharge, did someone talk to you about when and where to follow up with a doctor after you left the hospital? Yes   What do you believe caused you to be sick enough to be re-admitted? weakness   How often do you need to have someone help you when you read instructions, pamphlets, or other written material from your doctor or pharmacy? Never   Do you have problems taking your medications as prescribed? No   Do you have any problems affording any of  your prescribed medications? No   Do you have problems obtaining/receiving your medications? No  [facility]   Does the patient have transportation to healthcare appointments? Yes   Lives With alone   Does the patient have family/friends to help with healtcare needs after discharge? no   Are you currently feeling confused? No   Are you currently having problems thinking? No   Are you currently having memory problems? No   Have you felt down, depressed, or hopeless? 0   Have you felt little interest or pleasure in doing things? 0   In the last 7 days, my sleep quality was: good

## 2017-03-21 NOTE — ASSESSMENT & PLAN NOTE
Broad-specturm IV antibiotcs: Vancomycin, Zosyn  Follow up urine and blood cultures  IVF  Hold antihypertensives for now  Trend lactate  Trend WBC, temperature

## 2017-03-21 NOTE — PLAN OF CARE
PCP is Dr Fishman; last appt was on 03/17/17.  Ver[facility]are and [facility].  [facility]  Patient last discharged on 03/14/17, after a TIA, and [facility] was ordered however patient stated he canceled the HH when they called him because he is not home bound.  No needs anticipated at discharge.       03/21/17 1320   Discharge Assessment   Assessment Type Discharge Planning Assessment   Confirmed/corrected address and phone number on facesheet? Yes   Assessment information obtained from? Patient   Prior to hospitilization cognitive status: Alert/Oriented   Prior to hospitalization functional status: Independent   Current cognitive status: Alert/Oriented   Current Functional Status: Independent   Arrived From home or self-care   Lives With alone   Able to Return to Prior Arrangements yes   Is patient able to care for self after discharge? Yes   How many people do you have in your home that can help with your care after discharge? 0   Patient's perception of discharge disposition home or selfcare   Readmission Within The Last 30 Days current reason for admission unrelated to previous admission   Patient currently being followed by outpatient case management? No   Patient currently receives home health services? No   Does the patient currently use HME? No   Patient currently receives private duty nursing? No   Patient currently receives any other outside agency services? No   Equipment Currently Used at Home none   Do you have any problems affording any of your prescribed medications? No   Is the patient taking medications as prescribed? yes   Do you have any financial concerns preventing you from receiving the healthcare you need? No   Does the patient have transportation to healthcare appointments? Yes   Transportation Available car;family or friend will provide   On Dialysis? No   Does the patient receive services at the [facility]mad[facility]? No   Discharge Plan A Home   Patient/Family In Agreement With Plan  yes

## 2017-03-21 NOTE — SUBJECTIVE & OBJECTIVE
Interval History: No new issues overnight. Patient feeling better. Denies dizziness, N/V. Blood pressure improved this am. No fever and chills.     Review of Systems   Constitutional: Negative for appetite change, chills and diaphoresis.   Respiratory: Negative for cough, shortness of breath and wheezing.    Cardiovascular: Negative for chest pain and palpitations.   Gastrointestinal: Negative for abdominal pain, blood in stool and nausea.   Musculoskeletal: Negative for arthralgias, back pain and myalgias.   Neurological: Negative for dizziness, syncope and light-headedness.   Psychiatric/Behavioral: Negative for agitation and confusion. The patient is nervous/anxious.      Objective:     Vital Signs (Most Recent):  Temp: 97.7 °F (36.5 °C) (03/21/17 1100)  Pulse: 61 (03/21/17 1100)  Resp: 18 (03/21/17 1100)  BP: 102/65 (03/21/17 1100)  SpO2: 95 % (03/21/17 1100) Vital Signs (24h Range):  Temp:  [96.3 °F (35.7 °C)-97.8 °F (36.6 °C)] 97.7 °F (36.5 °C)  Pulse:  [60-65] 61  Resp:  [18-20] 18  SpO2:  [91 %-100 %] 95 %  BP: ()/(54-83) 102/65     Weight: 113.4 kg (250 lb)  Body mass index is 32.1 kg/(m^2).  No intake or output data in the 24 hours ending 03/21/17 1205   Physical Exam   Constitutional: He is oriented to person, place, and time. He appears well-developed and well-nourished.   HENT:   Head: Normocephalic and atraumatic.   Right Ear: External ear normal.   Mouth/Throat: Oropharynx is clear and moist.   Eyes: Conjunctivae and EOM are normal. Pupils are equal, round, and reactive to light.   Neck: Normal range of motion. Neck supple.   Cardiovascular: Normal rate, regular rhythm, normal heart sounds and intact distal pulses.    No murmur heard.  Pulmonary/Chest: Effort normal and breath sounds normal. He has no wheezes. He has no rales.   Abdominal: Soft. Bowel sounds are normal. He exhibits no distension.   Musculoskeletal: Normal range of motion.   Neurological: He is alert and oriented to person,  place, and time. No cranial nerve deficit.   Skin: Skin is warm and dry.   Psychiatric: He has a normal mood and affect. His behavior is normal.   Nursing note and vitals reviewed.      Significant Labs:   CBC:   Recent Labs  Lab 03/21/17  0001   WBC 14.50*   HGB 16.3   HCT 50.4        CMP:   Recent Labs  Lab 03/21/17  0001      K 4.6      CO2 21*   *   BUN 23   CREATININE 1.5*   CALCIUM 9.1   ANIONGAP 13   EGFRNONAA 49*       Significant Imaging: I have reviewed and interpreted all pertinent imaging results/findings within the past 24 hours.

## 2017-03-21 NOTE — PROGRESS NOTES
[facility] Hospital Medicine  Progress Note    Patient Name: Earnest Wallis  MRN: [id number]  Patient Class: IP- Inpatient   Admission Date: 3/20/2017  Length of Stay: 0 days  Attending Physician: Colby Summers MD  Primary Care Provider: Vipul Fishman MD        Subjective:     Principal Problem:Sepsis    HPI:  Mr. Wallis presents for evaluation of weakness. He reports experiencing generalized weakness and diaphoresis. He reportedly was hypoglycemic and hypotensive. He denies experiencing fever, chills, cough, worsening dyspnea, dysuria, vomiting, diarrhea. He reports chronic nasal congestion, sinusitis. He was evaluated in the ED and found to be hypotensives. He received IVF and was started on IV antibiotics.    Hospital Course:       Interval History: No new issues overnight. Patient feeling better. Denies dizziness, N/V. Blood pressure improved this am. No fever and chills.     Review of Systems   Constitutional: Negative for appetite change, chills and diaphoresis.   Respiratory: Negative for cough, shortness of breath and wheezing.    Cardiovascular: Negative for chest pain and palpitations.   Gastrointestinal: Negative for abdominal pain, blood in stool and nausea.   Musculoskeletal: Negative for arthralgias, back pain and myalgias.   Neurological: Negative for dizziness, syncope and light-headedness.   Psychiatric/Behavioral: Negative for agitation and confusion. The patient is nervous/anxious.      Objective:     Vital Signs (Most Recent):  Temp: 97.7 °F (36.5 °C) (03/21/17 1100)  Pulse: 61 (03/21/17 1100)  Resp: 18 (03/21/17 1100)  BP: 102/65 (03/21/17 1100)  SpO2: 95 % (03/21/17 1100) Vital Signs (24h Range):  Temp:  [96.3 °F (35.7 °C)-97.8 °F (36.6 °C)] 97.7 °F (36.5 °C)  Pulse:  [60-65] 61  Resp:  [18-20] 18  SpO2:  [91 %-100 %] 95 %  BP: ()/(54-83) 102/65     Weight: 113.4 kg (250 lb)  Body mass index is 32.1 kg/(m^2).  No intake or output data in the 24 hours ending  03/21/17 1205   Physical Exam   Constitutional: He is oriented to person, place, and time. He appears well-developed and well-nourished.   HENT:   Head: Normocephalic and atraumatic.   Right Ear: External ear normal.   Mouth/Throat: Oropharynx is clear and moist.   Eyes: Conjunctivae and EOM are normal. Pupils are equal, round, and reactive to light.   Neck: Normal range of motion. Neck supple.   Cardiovascular: Normal rate, regular rhythm, normal heart sounds and intact distal pulses.    No murmur heard.  Pulmonary/Chest: Effort normal and breath sounds normal. He has no wheezes. He has no rales.   Abdominal: Soft. Bowel sounds are normal. He exhibits no distension.   Musculoskeletal: Normal range of motion.   Neurological: He is alert and oriented to person, place, and time. No cranial nerve deficit.   Skin: Skin is warm and dry.   Psychiatric: He has a normal mood and affect. His behavior is normal.   Nursing note and vitals reviewed.      Significant Labs:   CBC:   Recent Labs  Lab 03/21/17  0001   WBC 14.50*   HGB 16.3   HCT 50.4        CMP:   Recent Labs  Lab 03/21/17  0001      K 4.6      CO2 21*   *   BUN 23   CREATININE 1.5*   CALCIUM 9.1   ANIONGAP 13   EGFRNONAA 49*       Significant Imaging: I have reviewed and interpreted all pertinent imaging results/findings within the past 24 hours.    Assessment/Plan:      Acute thrombus of left ventricle  Continue Apixaban      * Sepsis  Broad-specturm IV antibiotcs: Vancomycin, Zosyn  Follow up urine and blood cultures  IVF  Hold antihypertensives for now  Trend lactate  Trend WBC, temperature  No signs of infection Will DC IV antibiotics and monitor.       Chronic combined systolic and diastolic CHF (congestive heart failure)  Hold antihypertensives and diuretics for now  Monitor I/O, daily weight  Resume beta blocker       Type 2 diabetes mellitus with complication  Check BG prior to meals and QHS. Administer rapid-acting insulin  according to low-dose sliding scale        Moderate tobacco dependence  Educated on smoking cessation      Essential hypertension  Hold antihypertensives for now      CKD (chronic kidney disease) stage 3, GFR 30-59 ml/min  Avoid non-essential nephrotoxins, dose medications according to GFR  Monitor I/O, daily weight. Monitor for volume overload      VTE Risk Mitigation         Ordered     apixaban tablet 5 mg  2 times daily     Route:  Oral        03/21/17 0218     Medium Risk of VTE  Once      03/21/17 0218     Reason for No Pharmacological VTE Prophylaxis  Once      03/21/17 0218        Discussed POC with patient. Will DC IV Abx for now. He is taking lasix 20 mg daily at home. Hold for now.     Amanda Warner NP  Department of Hospital Medicine   [facility]

## 2017-03-22 VITALS
SYSTOLIC BLOOD PRESSURE: 138 MMHG | TEMPERATURE: 98 F | HEIGHT: 74 IN | OXYGEN SATURATION: 96 % | DIASTOLIC BLOOD PRESSURE: 86 MMHG | RESPIRATION RATE: 18 BRPM | HEART RATE: 61 BPM | BODY MASS INDEX: 32.08 KG/M2 | WEIGHT: 250 LBS

## 2017-03-22 PROBLEM — E86.0 DEHYDRATION: Status: RESOLVED | Noted: 2017-03-22 | Resolved: 2017-03-22

## 2017-03-22 PROBLEM — A41.9 SEPSIS: Status: RESOLVED | Noted: 2017-03-21 | Resolved: 2017-03-22

## 2017-03-22 PROBLEM — N17.9 AKI (ACUTE KIDNEY INJURY): Status: RESOLVED | Noted: 2017-03-05 | Resolved: 2017-03-22

## 2017-03-22 PROBLEM — E86.0 DEHYDRATION: Status: ACTIVE | Noted: 2017-03-22

## 2017-03-22 LAB
ALBUMIN SERPL BCP-MCNC: 3.2 G/DL
ALP SERPL-CCNC: 77 U/L
ALT SERPL W/O P-5'-P-CCNC: 35 U/L
ANION GAP SERPL CALC-SCNC: 9 MMOL/L
ANION GAP SERPL CALC-SCNC: 9 MMOL/L
AST SERPL-CCNC: 15 U/L
BASOPHILS # BLD AUTO: 0 K/UL
BASOPHILS # BLD AUTO: 0 K/UL
BASOPHILS NFR BLD: 0.4 %
BASOPHILS NFR BLD: 0.4 %
BILIRUB SERPL-MCNC: 0.6 MG/DL
BUN SERPL-MCNC: 14 MG/DL
BUN SERPL-MCNC: 14 MG/DL
CALCIUM SERPL-MCNC: 8.9 MG/DL
CALCIUM SERPL-MCNC: 8.9 MG/DL
CHLORIDE SERPL-SCNC: 108 MMOL/L
CHLORIDE SERPL-SCNC: 108 MMOL/L
CO2 SERPL-SCNC: 22 MMOL/L
CO2 SERPL-SCNC: 22 MMOL/L
CREAT SERPL-MCNC: 1.2 MG/DL
CREAT SERPL-MCNC: 1.2 MG/DL
DIFFERENTIAL METHOD: ABNORMAL
DIFFERENTIAL METHOD: ABNORMAL
EOSINOPHIL # BLD AUTO: 0.3 K/UL
EOSINOPHIL # BLD AUTO: 0.3 K/UL
EOSINOPHIL NFR BLD: 3.4 %
EOSINOPHIL NFR BLD: 3.4 %
ERYTHROCYTE [DISTWIDTH] IN BLOOD BY AUTOMATED COUNT: 14.6 %
ERYTHROCYTE [DISTWIDTH] IN BLOOD BY AUTOMATED COUNT: 14.6 %
EST. GFR  (AFRICAN AMERICAN): >60 ML/MIN/1.73 M^2
EST. GFR  (AFRICAN AMERICAN): >60 ML/MIN/1.73 M^2
EST. GFR  (NON AFRICAN AMERICAN): >60 ML/MIN/1.73 M^2
EST. GFR  (NON AFRICAN AMERICAN): >60 ML/MIN/1.73 M^2
GLUCOSE SERPL-MCNC: 101 MG/DL
GLUCOSE SERPL-MCNC: 101 MG/DL
HCT VFR BLD AUTO: 49.2 %
HCT VFR BLD AUTO: 49.2 %
HGB BLD-MCNC: 15.7 G/DL
HGB BLD-MCNC: 15.7 G/DL
LYMPHOCYTES # BLD AUTO: 3.3 K/UL
LYMPHOCYTES # BLD AUTO: 3.3 K/UL
LYMPHOCYTES NFR BLD: 37.4 %
LYMPHOCYTES NFR BLD: 37.4 %
MAGNESIUM SERPL-MCNC: 2.1 MG/DL
MCH RBC QN AUTO: 29 PG
MCH RBC QN AUTO: 29 PG
MCHC RBC AUTO-ENTMCNC: 31.9 %
MCHC RBC AUTO-ENTMCNC: 31.9 %
MCV RBC AUTO: 91 FL
MCV RBC AUTO: 91 FL
MONOCYTES # BLD AUTO: 0.7 K/UL
MONOCYTES # BLD AUTO: 0.7 K/UL
MONOCYTES NFR BLD: 7.6 %
MONOCYTES NFR BLD: 7.6 %
NEUTROPHILS # BLD AUTO: 4.5 K/UL
NEUTROPHILS # BLD AUTO: 4.5 K/UL
NEUTROPHILS NFR BLD: 51.2 %
NEUTROPHILS NFR BLD: 51.2 %
PHOSPHATE SERPL-MCNC: 3.2 MG/DL
PLATELET # BLD AUTO: 177 K/UL
PLATELET # BLD AUTO: 177 K/UL
PMV BLD AUTO: 9.9 FL
PMV BLD AUTO: 9.9 FL
POCT GLUCOSE: 188 MG/DL (ref 70–110)
POCT GLUCOSE: 188 MG/DL (ref 70–110)
POTASSIUM SERPL-SCNC: 4.1 MMOL/L
POTASSIUM SERPL-SCNC: 4.1 MMOL/L
PROT SERPL-MCNC: 6.3 G/DL
RBC # BLD AUTO: 5.42 M/UL
RBC # BLD AUTO: 5.42 M/UL
SODIUM SERPL-SCNC: 139 MMOL/L
SODIUM SERPL-SCNC: 139 MMOL/L
WBC # BLD AUTO: 8.8 K/UL
WBC # BLD AUTO: 8.8 K/UL

## 2017-03-22 PROCEDURE — 36415 COLL VENOUS BLD VENIPUNCTURE: CPT

## 2017-03-22 PROCEDURE — 25000003 PHARM REV CODE 250: Performed by: PHYSICIAN ASSISTANT

## 2017-03-22 PROCEDURE — 85025 COMPLETE CBC W/AUTO DIFF WBC: CPT

## 2017-03-22 PROCEDURE — 25000003 PHARM REV CODE 250: Performed by: NURSE PRACTITIONER

## 2017-03-22 PROCEDURE — 80053 COMPREHEN METABOLIC PANEL: CPT

## 2017-03-22 PROCEDURE — 83735 ASSAY OF MAGNESIUM: CPT

## 2017-03-22 PROCEDURE — 84100 ASSAY OF PHOSPHORUS: CPT

## 2017-03-22 PROCEDURE — 63600175 PHARM REV CODE 636 W HCPCS: Performed by: PHYSICIAN ASSISTANT

## 2017-03-22 RX ORDER — FUROSEMIDE 20 MG/1
40 TABLET ORAL 2 TIMES DAILY
Start: 2017-03-22

## 2017-03-22 RX ORDER — ACETAMINOPHEN 325 MG/1
650 TABLET ORAL EVERY 6 HOURS PRN
Refills: 0 | COMMUNITY
Start: 2017-03-22

## 2017-03-22 RX ORDER — GLIPIZIDE 5 MG/1
5 TABLET, FILM COATED, EXTENDED RELEASE ORAL
Qty: 30 TABLET | Refills: 0 | Status: SHIPPED | OUTPATIENT
Start: 2017-03-22 | End: 2017-03-24

## 2017-03-22 RX ORDER — LISINOPRIL 5 MG/1
2.5 TABLET ORAL DAILY
Qty: 15 TABLET | Refills: 0
Start: 2017-03-22 | End: 2017-04-21

## 2017-03-22 RX ADMIN — INSULIN DETEMIR 25 UNITS: 100 INJECTION, SOLUTION SUBCUTANEOUS at 09:03

## 2017-03-22 RX ADMIN — APIXABAN 5 MG: 2.5 TABLET, FILM COATED ORAL at 09:03

## 2017-03-22 RX ADMIN — PANTOPRAZOLE SODIUM 40 MG: 40 TABLET, DELAYED RELEASE ORAL at 09:03

## 2017-03-22 RX ADMIN — CARVEDILOL 12.5 MG: 6.25 TABLET, FILM COATED ORAL at 10:03

## 2017-03-22 RX ADMIN — ATORVASTATIN CALCIUM 40 MG: 40 TABLET, FILM COATED ORAL at 09:03

## 2017-03-22 NOTE — PLAN OF CARE
Problem: Patient Care Overview  Goal: Plan of Care Review  Outcome: Ongoing (interventions implemented as appropriate)  POC reviewed with patient.  Patient is anticipating DC home today.  Voices no complaints of pain or discomfort.  Safe environment maintained.  VSS, BG stable.

## 2017-03-22 NOTE — DISCHARGE SUMMARY
"[facility] Medicine  Discharge Summary      Patient Name: Earnest Wallis  MRN: [id number]  Admission Date: 3/20/2017  Hospital Length of Stay: 1 days  Discharge Date and Time:  03/22/2017 11:46 AM  Attending Physician: Colby Summers MD   Discharging Provider: EARNEST Bardales  Primary Care Provider: Vipul Fishman MD      HPI:   Mr. Wallis presents for evaluation of weakness. He reports experiencing generalized weakness and diaphoresis. He reportedly was hypoglycemic and hypotensive. He denies experiencing fever, chills, cough, worsening dyspnea, dysuria, vomiting, diarrhea. He reports chronic nasal congestion, sinusitis. He was evaluated in the ED and found to be hypotensives with YAO. Pt admitted for further evaluation and treatment.    * No surgery found *      Indwelling Lines/Drains at time of discharge:   Lines/Drains/Airways          No matching active lines, drains, or airways        Hospital Course:   The patient was monitored closely during his stay. He was given IVF for YAO secondary to dehydration and his  ACEI and diuretics were held. Pt was initially note to have blood sugar of 70 PTA. Patient's glucophage was discontinued due to his recurrent YAO/CKD  Patient remained afebrile during his stay with no signs of infection noted. Sepsis was excluded. Patient's YAO/ dehydration resolved after IVF. Patient's overall condition remained stable and patient was eating 100% of meals and reported "I feel good". Patient was discharged to home. Records reviewed prior to discharge showed Echo on 3/05/17 with EF of 26%. Pt was resumed on his diuretics for home with dose reduced from 60 mg po bid to 40 mg po bid. Pt's lisinopril was reduced from 5 mg po daily to 2.5 mg po daily. His glucophage was discontinued due to his recurrent YAO and CKD and he was started on glucotrol 5 mg po q am. It was felt pt would benefit from Home Health, however, last admission home health was " arranged for patient and he had previously canceled it. Pt was to follow up with his PCP for further blood pressure and Diabetic monitoring. Pt also has cardiology appt with Dr. Marin already scheduled in next 2 months.       Consults:     Significant Diagnostic Studies: Labs:   CMP   Recent Labs  Lab 03/21/17  0001 03/22/17  0423    139  139   K 4.6 4.1  4.1    108  108   CO2 21* 22*  22*   * 101  101   BUN 23 14  14   CREATININE 1.5* 1.2  1.2   CALCIUM 9.1 8.9  8.9   PROT  --  6.3   ALBUMIN  --  3.2*   BILITOT  --  0.6   ALKPHOS  --  77   AST  --  15   ALT  --  35   ANIONGAP 13 9  9   ESTGFRAFRICA 56* >60  >60   EGFRNONAA 49* >60  >60    and CBC   Recent Labs  Lab 03/21/17  0001 03/22/17  0423   WBC 14.50* 8.80  8.80   HGB 16.3 15.7  15.7   HCT 50.4 49.2  49.2    177  177       Pending Diagnostic Studies:     None        Final Active Diagnoses:    Diagnosis Date Noted POA    Essential hypertension [I10] 03/13/2017 Yes    CKD (chronic kidney disease) stage 3, GFR 30-59 ml/min [N18.3] 03/13/2017 Yes    Chronic combined systolic and diastolic CHF (congestive heart failure) [I50.42] 03/05/2017 Yes    Type 2 diabetes mellitus with complication [E11.8] 03/05/2017 Yes    Acute thrombus of left ventricle [I21.3] 03/05/2017 Yes    Moderate tobacco dependence [F17.200] 03/05/2017 Yes      Problems Resolved During this Admission:    Diagnosis Date Noted Date Resolved POA    PRINCIPAL PROBLEM:  Sepsis [A41.9] 03/21/2017 03/22/2017 Yes    Dehydration [E86.0] 03/22/2017 03/22/2017 Yes    YAO (acute kidney injury) [N17.9] 03/05/2017 03/22/2017 Yes      No new Assessment & Plan notes have been filed under this hospital service since the last note was generated.  Service: Hospital Medicine      Discharged Condition: stable    Disposition: Home or Self Care    Follow Up:  Follow-up Information     Follow up with Vipul Fishman MD In 1 week.    Specialties:  Family Medicine, Internal  Medicine    Why:  Take blood sugar before meals and at bedtime everyday and keep log for follow up with PCP    Contact information:    [id number] [address] [facility] [address]  [facility] [id number] [phone]          Patient Instructions:     Diet general   Order Comments: 1800 ADA     Activity as tolerated     Call MD for:  persistent dizziness, light-headedness, or visual disturbances     Call MD for:  increased confusion or weakness     Call MD for:   Order Comments: Any decline in condition       Medications:  Reconciled Home Medications:   Current Discharge Medication List      START taking these medications    Details   acetaminophen (TYLENOL) 325 MG tablet Take 2 tablets (650 mg total) by mouth every 6 (six) hours as needed.  Refills: 0      glipiZIDE (GLUCOTROL) 5 MG TR24 Take 1 tablet (5 mg total) by mouth daily with breakfast.  Qty: 30 tablet, Refills: 0         CONTINUE these medications which have CHANGED    Details   !! furosemide (LASIX) 20 MG tablet Take 2 tablets (40 mg total) by mouth 2 (two) times daily.      lisinopril (PRINIVIL,ZESTRIL) 5 MG tablet Take 0.5 tablets (2.5 mg total) by mouth once daily.  Qty: 15 tablet, Refills: 0       !! - Potential duplicate medications found. Please discuss with provider.      CONTINUE these medications which have NOT CHANGED    Details   apixaban 5 mg Tab 1 tab po bid  Qty: 60 tablet, Refills: 5    Associated Diagnoses: Coronary thrombosis      atorvastatin (LIPITOR) 40 MG tablet Take 1 tablet (40 mg total) by mouth once daily.  Qty: 30 tablet, Refills: 1      carvedilol (COREG) 25 MG tablet Take 12.5 mg by mouth 2 (two) times daily with meals.      !! furosemide (LASIX) 40 MG tablet Take 1 tablet (40 mg total) by mouth daily as needed.  Qty: 30 tablet, Refills: 0      insulin NPH-insulin regular, 70/30, (NOVOLIN 70/30) 100 unit/mL (70-30) injection Inject 30 Units into the skin once daily.      potassium chloride SA (K-DUR,KLOR-CON) 20 MEQ tablet Take 20 mEq by mouth once daily.        !! - Potential duplicate medications found. Please discuss with provider.      STOP taking these medications       metformin (GLUCOPHAGE) 1000 MG tablet Comments:   Reason for Stopping:             Time spent on the discharge of patient: 52 minutes    EARNEST Bardales  Department of Hospital Medicine  [facility]

## 2017-03-22 NOTE — NURSING
Patient is anticipating discharge later today.  Discharge instructions will be reviewed at time of discharge.

## 2017-03-22 NOTE — PLAN OF CARE
Problem: Fall Risk (Adult)  Goal: Absence of Falls  Patient will demonstrate the desired outcomes by discharge/transition of care.   Outcome: Outcome(s) achieved Date Met:  03/22/17  Patient free from falls or injuries this shift.

## 2017-03-23 ENCOUNTER — PATIENT OUTREACH (OUTPATIENT)
Dept: ADMINISTRATIVE | Facility: CLINIC | Age: 62
End: 2017-03-23
Payer: MEDICARE

## 2017-03-23 ENCOUNTER — TELEPHONE (OUTPATIENT)
Dept: FAMILY MEDICINE | Facility: CLINIC | Age: 62
End: 2017-03-23

## 2017-03-23 NOTE — NURSING
"Late entry 3/22/16 0901:  Spoke with patient at bedside, regarding care and compliance with medications. Stressed importance of compliance, and offered home health.  Patient stated, "you have to be homebound to get home health, and I am not".  Patient stated he knows what medications to take, with dosages, and will take his meds.  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  Patient verbalized understanding, I feel patient cannot keep on topic, and needs further instructions of medications and overall compliance.  MIK Murillo RN case mgmt  "

## 2017-03-23 NOTE — PROGRESS NOTES
"Medication Reconciliation reviewed with patient. He reports," Not taking Glucotrol 5mg I am taking Metformin 1000mg daily. Coreg not taking. Lisinopril 2.5mg taking 10mg daily. Lasix 20mg (2 tab) BID taking 60mg mg BID. Patient instructed on correct doses of medication. Reports will take Lasix as ordered but will continue with Metformin and monitor glucose. Today's level 118. Will not take Coreg. Lisinopril to small to break up. Reported all this to MD and message sent to Shelbi Butler CM.  "

## 2017-03-23 NOTE — PLAN OF CARE
03/23/17 0837   Final Note   Assessment Type Final Discharge Note   Discharge Disposition Home   Discharge planning education complete? Yes   Hospital Follow Up  Appt(s) scheduled? Yes

## 2017-03-23 NOTE — NURSING
Called patient at home, spoke with patient regarding the option for home health for a few visits to assist with medication regimen. Notified patient of concern secondary to multiple readmissions.  Patient denied wanting home health, stating he can take care of himself, and does not need any assistance.  Provided patient with my number, and to call if he has any issues obtaining medications, follow up care, or questions.  Patient verbalized understanding.  MIK Murillo RN case mgmt

## 2017-03-23 NOTE — TELEPHONE ENCOUNTER
"----- Message from Coty De La Cruz RN sent at 3/23/2017  2:49 PM CDT -----  Medication Reconciliation reviewed with patient. He reports," Not taking Glucotrol 5mg I am taking Metformin 1000mg daily. Coreg not taking. Lisinopril 2.5mg taking 10mg daily. Lasix 20mg (2 tab) BID taking 60mg mg BID. Patient instructed on correct doses of medication. Reports will take Lasix as ordered but will continue with Metformin and monitor glucose. Today's level 118. Will not take Coreg. Lisinopril to small to break up. Reported all this to MD and message sent to Shelbi Butler CM.    Please contact patient and advise.    Respectfully,  UMANG Hill,RN,CCRN  Care Coordinator [facility] specialist  "

## 2017-03-23 NOTE — PATIENT INSTRUCTIONS
Chronic Kidney Disease (CKD)    The role of the kidneys is to remove waste products and extra water from the blood.  When the kidneys do not work as they should, waste products begin to build up in the blood. This is called chronic kidney disease (CKD). CKD means that you have kidney damage or a decrease in kidney function lasting at least 3 months. CKD allows extra water, waste, and toxins to build up in the body. This can eventually become life-threatening. You might need dialysis or a kidney transplant to stay alive. This most severe form is called end stage renal disease.  Diabetes is the leading causes of chronic renal failure. Other causes include high blood pressure, hardening of the arteries (atherosclerosis), lupus, inflammation of the blood vessels (vasculitis), and past viral or bacterial infections. Certain over-the-counter pain medicines can cause renal failure when taken often over a long period of time. These include aspirin, ibuprofen, and related anti-inflammatory medicines called NSAIDs (nonsteroidal anti-inflammatory drugs).  Home care  The following guidelines will help you care for yourself at home:  · If you have diabetes, talk with your healthcare provider about keeping your blood sugar under control. Ask if you need to make and changes to your diet, lifestyle, or medicines.  · If you have high blood pressure:  ¨ Take prescribed medicine to lower your blood pressure to the recommended goal of less than 130/80.  ¨ Start a regular exercise program that you enjoy. Check with your healthcare provider to be sure your planned exercise program is right for you.  ¨ Eat less salt (sodium). Your healthcare provider can tell you how much salt per day is safe for you.  · If you are overweight, talk with your healthcare provider about a weight loss plan.  · If you smoke, you must quit. Smoking makes kidney disease worse. Talk with your healthcare provider about ways to help you quit.  For more information,  visit the following links:  ¨ www.Acerfree.gov/sites/default/files/pdf/clearing-the-air-accessible.pdf  ¨ www.smokefree.gov  ¨ www.cancer.org/healthy/stayawayfromtobacco/guidetoquittingsmoking/  · Most people with CKD need to follow a special diet.  Be sure you understand yours. In general, you will need to limit protein, salt, potassium, and phosphorus. You also need to limit how much fluid you drink.   · CKD is a risk factor for heart disease. Talk with your healthcare provider about any other risk factors you might have and what you can do to lessen them.  · Talk with your healthcare provider about any medicines you are taking to find out if they need to be reduced or stopped.  · Don't use the following over-the-counter medicines, or consult your healthcare provider before using:  ¨ Aspirin and NSAIDs such as ibuprofen or naproxen. Using acetaminophen for fever or pain is OK.  ¨ Laxatives and antacids containing magnesium or aluminum  ¨ Fleet or phospho soda enemas containing phosphorus  ¨ Certain stomach acid-blocking medicine such as cimetidine or ranitidine   ¨ Decongestants containing pseudoephedrine   ¨ Herbal supplements  Follow-up care  Follow up with your healthcare provider, or as advised. Contact one of the following for more information:  · American Association of Kidney Patients 210-703-0817 www.aakp.org  · National Kidney Foundation 820-359-8481 www.kidney.org  · American Kidney Fund 997-185-7862 www.kidneyfund.org  · National Kidney Disease Education Program 866-4KIDNEY www.nkdep.nih.gov  If an X-ray, ECG (cardiogram), or other diagnostic test was taken, you will be told of any new findings that may affect your care.  Call 911  Call 911 if you have any of the following:  · Severe weakness, dizziness, fainting, drowsiness, or confusion  · Chest pain or shortness of breath  · Heart beating fast, slow, or irregularly  When to seek medical advice  Call your healthcare provider right away if any of  these occur:  · Nausea or vomiting  · Fever of 100.4°F (38°C) or higher, or as directed by your healthcare provider  · Unexpected weight gain or swelling in the legs, ankles, or around the eyes  · Decrease or absent urine output  Date Last Reviewed: 9/1/2016  © 4513-3190 [employer]. [address] [place] [address], [place] [address]. All rights reserved. This information is not intended as a substitute for professional medical care. Always follow your healthcare professional's instructions.

## 2017-03-26 LAB
BACTERIA BLD CULT: NORMAL
BACTERIA BLD CULT: NORMAL

## 2017-03-27 DIAGNOSIS — E11.9 TYPE 2 DIABETES, HBA1C GOAL < 7%: Primary | ICD-10-CM

## 2017-03-29 ENCOUNTER — DOCUMENTATION ONLY (OUTPATIENT)
Dept: FAMILY MEDICINE | Facility: CLINIC | Age: 62
End: 2017-03-29

## 2017-03-29 NOTE — PROGRESS NOTES
Health Maintenance Due   Topic Date Due    Hepatitis C Screening  1955    Eye Exam  10/15/1963    TETANUS VACCINE  10/15/1971    Pneumococcal PPSV23 (Medium Risk) (1) 10/15/1971    Colonoscopy  10/15/2003    Zoster Vaccine  10/15/2013    Influenza Vaccine  08/01/2016

## 2017-06-13 DIAGNOSIS — E11.8 TYPE 2 DIABETES MELLITUS WITH COMPLICATION, WITHOUT LONG-TERM CURRENT USE OF INSULIN: Primary | ICD-10-CM

## 2017-11-02 ENCOUNTER — HOSPITAL ENCOUNTER (OUTPATIENT)
Dept: HOSPITAL 65 - CT | Age: 62
Discharge: HOME | End: 2017-11-02
Attending: FAMILY MEDICINE
Payer: OTHER GOVERNMENT

## 2017-11-02 DIAGNOSIS — Z72.0: ICD-10-CM

## 2017-11-02 DIAGNOSIS — M47.894: Primary | ICD-10-CM

## 2017-11-02 PROCEDURE — 71250 CT THORAX DX C-: CPT

## 2017-11-02 NOTE — DIREP
PROCEDURE:CT CHEST W/O

 

COMPARISON:None.

 

INDICATIONS:LONG HISTORY 30 PACK YEARS SMOKING, OVER 53, STILL SMOKING

 

TECHNIQUE:Helical sections through the chest were performed from the lung 

apices through the diaphragms without IV contrast. Sagittal and coronal 

reconstructions are obtained from source images.

 

FINDINGS:

LUNGS:No emphysematous changes or infiltrates are seen bilaterally.  A few 

small calcified granulomas are scattered in the right lung.  There is a 2 mm 

noncalcified pulmonary nodule in the right mid lung field and a 4 mm 

noncalcified pulmonary nodule in the right lower lobe.  A 4 mm noncalcified 

pulmonary nodule is seen in the left lung apex.  There are two 2 mm 

noncalcified pulmonary nodules in the left lower lobe and a 4 mm subpleural 

nodule in the left lower lobe.

PLEURA:Normal.  No mass or effusion.  

CARDIAC:A left-sided pacemaker is seen with the heart size normal.

MEDIASTINUM:Normal.  No mass or adenopathy.  

TEENA:Normal.  No mass or adenopathy.  

AORTA:Normal.  No aneurysm.  

CHEST WALL:Normal.  No mass or axillary adenopathy.  

LIMITED ABDOMEN:Normal.  Limited images of the upper abdomen are unremarkable. 

 

BONES:Mild degenerative changes are seen in the lower thoracic spine and 

several remote left lower rib fractures.

OTHER:Negative.  

 

CONCLUSION:There are multiple small sub cm bilateral noncalcified pulmonary 

nodules as described above.  Recommend a follow up study in 3-6 months to 

assess stability.

 

 

 

Dictated by: Wm Raphael M.D. on 11/02/2017 at 03:33 PM     

Electronically Signed By: Wm Raphael M.D. on 11/02/2017 at 03:40 PM

## 2018-03-22 DIAGNOSIS — Z12.11 COLON CANCER SCREENING: ICD-10-CM

## 2018-03-22 DIAGNOSIS — Z11.59 NEED FOR HEPATITIS C SCREENING TEST: ICD-10-CM

## 2018-12-06 NOTE — DIREP
PROCEDURE:CHEST 2 VIEWS

 

COMPARISON:None.

 

INDICATIONS:PRE-OP CARDIAC CATH, ABN STRESS, CAD, CHF

 

FINDINGS:

LUNGS/PLEURA:No significant pulmonary parenchymal abnormalities. No effusions.

VASCULATURE:Normal.  Unremarkable pulmonary vasculature.

CARDIAC:Normal heart size with mild tortuosity of the thoracic aorta.  

Implanted biventricular pacing device noted over left upper chest.

MEDIASTINUM:Normal.  No visible mass or adenopathy. 

BONES:No acute pathology P

OTHER:Negative.  

 

CONCLUSION:Implanted cardiac defibrillator noted.  No evidence of acute 

cardiac or pulmonary disease.

 

 

 

Dictated by: Karlo Briggs M.D. on 12/06/2018 at 04:03 PM     

Electronically Signed By: Karlo Briggs M.D. on 12/06/2018 at 04:07 PM

## 2018-12-11 ENCOUNTER — HOSPITAL ENCOUNTER (OUTPATIENT)
Dept: HOSPITAL 65 - SDC | Age: 63
Setting detail: OBSERVATION
LOS: 1 days | Discharge: HOME | End: 2018-12-12
Attending: SPECIALIST | Admitting: SPECIALIST
Payer: OTHER GOVERNMENT

## 2018-12-11 VITALS — DIASTOLIC BLOOD PRESSURE: 71 MMHG | SYSTOLIC BLOOD PRESSURE: 102 MMHG

## 2018-12-11 VITALS — DIASTOLIC BLOOD PRESSURE: 100 MMHG | SYSTOLIC BLOOD PRESSURE: 152 MMHG

## 2018-12-11 VITALS — HEIGHT: 74 IN | WEIGHT: 250 LBS | BODY MASS INDEX: 32.08 KG/M2

## 2018-12-11 VITALS — DIASTOLIC BLOOD PRESSURE: 63 MMHG | SYSTOLIC BLOOD PRESSURE: 97 MMHG

## 2018-12-11 VITALS — SYSTOLIC BLOOD PRESSURE: 129 MMHG | DIASTOLIC BLOOD PRESSURE: 94 MMHG

## 2018-12-11 DIAGNOSIS — I25.2: ICD-10-CM

## 2018-12-11 DIAGNOSIS — Z95.5: ICD-10-CM

## 2018-12-11 DIAGNOSIS — I25.10: ICD-10-CM

## 2018-12-11 DIAGNOSIS — I50.23: Primary | ICD-10-CM

## 2018-12-11 DIAGNOSIS — Z79.4: ICD-10-CM

## 2018-12-11 DIAGNOSIS — I42.0: ICD-10-CM

## 2018-12-11 DIAGNOSIS — Z79.899: ICD-10-CM

## 2018-12-11 DIAGNOSIS — J44.9: ICD-10-CM

## 2018-12-11 DIAGNOSIS — Z87.891: ICD-10-CM

## 2018-12-11 DIAGNOSIS — I25.5: ICD-10-CM

## 2018-12-11 DIAGNOSIS — Z83.3: ICD-10-CM

## 2018-12-11 DIAGNOSIS — I48.0: ICD-10-CM

## 2018-12-11 DIAGNOSIS — Z82.3: ICD-10-CM

## 2018-12-11 DIAGNOSIS — E11.51: ICD-10-CM

## 2018-12-11 LAB
ALP INTEST CFR SERPL: 129 U/L (ref 50–136)
ALT SERPL-CCNC: 45 U/L (ref 12–78)
AST SERPL-CCNC: 32 U/L (ref 0–35)
CALCIUM SERPL-MCNC: 9.4 MG/DL (ref 8.4–10.5)
CO2 BLDA-SCNC: 24.6 MMOL/L (ref 20–32)
GLUCOSE PRE 100 G GLC PO SERPL-MCNC: 106 MG/DL (ref 70–110)

## 2018-12-11 PROCEDURE — C1894 INTRO/SHEATH, NON-LASER: HCPCS

## 2018-12-11 PROCEDURE — 80061 LIPID PANEL: CPT

## 2018-12-11 PROCEDURE — C1760 CLOSURE DEV, VASC: HCPCS

## 2018-12-11 PROCEDURE — 36415 COLL VENOUS BLD VENIPUNCTURE: CPT

## 2018-12-11 PROCEDURE — 99153 MOD SED SAME PHYS/QHP EA: CPT

## 2018-12-11 PROCEDURE — 96372 THER/PROPH/DIAG INJ SC/IM: CPT

## 2018-12-11 PROCEDURE — 99152 MOD SED SAME PHYS/QHP 5/>YRS: CPT

## 2018-12-11 PROCEDURE — 96374 THER/PROPH/DIAG INJ IV PUSH: CPT

## 2018-12-11 PROCEDURE — 85730 THROMBOPLASTIN TIME PARTIAL: CPT

## 2018-12-11 PROCEDURE — 80053 COMPREHEN METABOLIC PANEL: CPT

## 2018-12-11 PROCEDURE — C1887 CATHETER, GUIDING: HCPCS

## 2018-12-11 PROCEDURE — 71046 X-RAY EXAM CHEST 2 VIEWS: CPT

## 2018-12-11 PROCEDURE — 84132 ASSAY OF SERUM POTASSIUM: CPT

## 2018-12-11 PROCEDURE — 85610 PROTHROMBIN TIME: CPT

## 2018-12-11 PROCEDURE — 93458 L HRT ARTERY/VENTRICLE ANGIO: CPT

## 2018-12-11 PROCEDURE — 83880 ASSAY OF NATRIURETIC PEPTIDE: CPT

## 2018-12-11 PROCEDURE — 82948 REAGENT STRIP/BLOOD GLUCOSE: CPT

## 2018-12-11 RX ADMIN — MAGNESIUM SULFATE SCH MLS/HR: 500 INJECTION, SOLUTION INTRAMUSCULAR; INTRAVENOUS at 08:24

## 2018-12-11 RX ADMIN — CARVEDILOL SCH MG: 12.5 TABLET, FILM COATED ORAL at 21:02

## 2018-12-11 NOTE — [facility]
DATE OF SERVICE:  12/11/2018



This is a 63-year-old male.



PRECATHETERIZATION DIAGNOSES:  Abnormal myocardial perfusion scan with

inferoposterior hypoperfusion abnormality with a prior LAD stenting with dilated

cardiomyopathy, post-biventricular pacer, ejection fraction is 20%.  Assess for

severity of coronary artery disease.



PREOPERATIVE MEDICATIONS:  Versed 1 mg IV, fentanyl 12.5 mcg IV.



ANTICOAGULATION:  Heparin 2000 units intra-arterially, 2000 units in the dye,

2000 units in the flush solution.  Dye used is Isovue, total amount is 203 mL.



CATHETERS:  JL4 6-British, JR4 6-British, and 6-British angled pigtail catheter. 

Multiple catheters were used for cannulation of right coronary artery, which had

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

high uptake of the right coronary ostium and selective cannulation could not be

achieved, but good visualization of the right coronary artery was achieved.  



ARTERIAL TIME:  42 minutes.



FLUOROSCOPY TIME:  About 15 minutes.



PROCEDURES:  Left heart catheterization, bilateral selective coronary

arteriography, left ventriculography via right femoral Jamison approach.



NARRATION OF PROCEDURE:  Under local anesthesia, right femoral artery was

punctured percutaneously by arterial needle, guide wire passed in right femoral

artery, 6-British Cordis sheath introduced, side port of the sheath used for

femoral arterial pressure monitoring. Sheath anchored with suture.  Left Jamison

catheter introduced over guide wire into ascending aorta left coronary artery

cannulated and left coronary angiography performed in Tamazight and VILLEGAS projections

with craniocaudal applications to visualize all branches. Left catheter

exchanged for right coronary catheter and right coronary angiography performed

in Tamazight and VILLEGAS. This catheter exchanged for 6-British pigtail catheter and

catheter crossed the aortic valve and left ventricular LVEDP measured and LV

gram performed in 30 degrees VILLEGAS view with 30 mL Omnipaque dye and panning of

descending aorta attempted. Patient tolerated procedure well. No complications

of procedure. Angio-Seal deployed for hemostasis.



The left main is patent proximal LAD and the stent appears to be patent. 

Circumflex is patent.  Right coronary artery is of high uptake and the artery

appears to be patent.  Left ventricle is markedly dilated with LVEDP of 10 mm

with global hypokinesis, ejection fraction of 20%.  No evidence of mild mitral

regurgitation is noted.  His LV pressure was 102/10 femoral artery pressure

103/72 with a mean of 85.  No gradient across the aorta.



FINAL CONCLUSION:  No flow obstructive coronary artery disease with left main

patent, proximal LAD patent, circumflex patent, right coronary ostium was high

uptake.  Nonselective visualization of the right coronary artery without any

flow obstruction, LV dilatation, dilated cardiomyopathy, 20% ejection fraction

____ in view of the fact that the patient has got severe cardiomyopathy and

congestive heart failure with the manifestations of acute on chronic systolic

heart failure, the patient was admitted in the hospital and optimization of

medical therapy with initiation of Entresto 24/26 one tablet daily from

12/12/2018 along with Lasix 20 mg IV and continuation of Coreg 12.5 mg twice a

day and check his electrolytes in addition of Aldactone will be done.  Further

management will depend on the clinical course.





______________________________

Jeff Kolb MD



DR:  POOJA/luis  JOB# [id number]  [id number]

DD:  12/11/2018 12:39  DT:  12/11/2018 13:20

## 2018-12-11 NOTE — NUR
heart cath



Patient care assumed when the patient arrived from OR post heart cath. Patient drowsy easily 
awakened for 2 hours. Patient left on Oxygen until he was fully awake due to Periods of 
apnea noted. See documentation on groin site.

## 2018-12-12 VITALS — DIASTOLIC BLOOD PRESSURE: 69 MMHG | SYSTOLIC BLOOD PRESSURE: 114 MMHG

## 2018-12-12 VITALS — SYSTOLIC BLOOD PRESSURE: 94 MMHG | DIASTOLIC BLOOD PRESSURE: 70 MMHG

## 2018-12-12 VITALS — DIASTOLIC BLOOD PRESSURE: 81 MMHG | SYSTOLIC BLOOD PRESSURE: 123 MMHG

## 2018-12-12 VITALS — DIASTOLIC BLOOD PRESSURE: 63 MMHG | SYSTOLIC BLOOD PRESSURE: 103 MMHG

## 2018-12-12 LAB
CHOLEST SERPL-MCNC: 124 MG/DL (ref 120–240)
HDLC SERPL-MCNC: 39 MG/DL (ref 32–96)

## 2018-12-12 RX ADMIN — CARVEDILOL SCH MG: 12.5 TABLET, FILM COATED ORAL at 09:05

## 2018-12-12 RX ADMIN — MAGNESIUM SULFATE SCH MLS/HR: 500 INJECTION, SOLUTION INTRAMUSCULAR; INTRAVENOUS at 08:00

## 2018-12-12 NOTE — NUR
DISCHARGE PLAN 



CASE MANAGEMENT VISITED WITH PATIENT CONCERNING DISCHARGE PLAN AND NEEDS.  LIVES AT HOME 
WITH TWO DOGS.  INDEPENDENT OF ADLS. DOES NOT WORK ON DISABILITY AFTER LAST HEART ATTACK.  
DENIES NEED FOR HOME OXYGEN OR DME.  CM EDUCATED PATIENT ON OUTPATIENT SERVICES, HOME HEALTH 
AND SNF.  DENIES NEED FOR SERVICES AT THIS TIME.  HAS FUNS AVAILABLE TO PAY FOR 
PRESCRIPTIONS IF NEEDED.  PATIENT HAD CONCERNS ABOUT [facility] AND 
NOT WRITTEN UPON DISCHARGE.  CM SPOKE WITH DR. TIPTON. SAMPLES OF ENTRESTO PROVIDED BY DR. TIPTON WHILE HIS OFFICE GETS THE MEDICATIONS APPROVED THROUGH INSURANCE.  DISCHARGE GOAL IS 
TO DISCHARGE HOME AND CONTINUE SELF CARE.  DENIES FURTHER NEEDS AT THIS TIME.

## 2018-12-12 NOTE — NUR
Report



Report received at shift change from Madhavi JORGE. Alert and oriented able to make his needs 
known.

## 2018-12-12 NOTE — [facility]
ADMIT DATE:  2018



CHIEF COMPLAINT:  Acute on chronic systolic heart failure.



HISTORY OF PRESENT ILLNESS:  The patient is a 63-year-old white male who has

history of dilated cardiomyopathy, paroxysmal atrial fibrillation, CAD and had a

prior coronary stent and he has a very poor ejection fraction, came in for

cardiac catheterization because of abnormal myocardial perfusion scan with

inferoposterior ischemic substrate and was noted to have patent stents, but his

LV function was very poor, somewhere around 10-15%, LVEDP was elevated at 25 mm

and he displayed acute on chronic systolic heart failure with significant

dyspnea and poor effort tolerance and hence I admitted him for change of his

medications and start him on IV Lasix and Entresto to optimize his CHF therapy.



ALLERGIES:  None known.



MEDICATIONS:  He has been on Eliquis 5 mg twice a day, potassium 20 mEq once a

day.  He is diabetic, Lantus insulin 50 units at bedtime, lisinopril 10 mg once

a day, Lasix 40 mg once a day, Coreg 12.5 mg twice a day, Lipitor 40 mg once a

day.



PAST MEDICAL HISTORY:  Ischemic cardiomyopathy, chronic systolic heart failure,

has had periodic hypotension, old myocardial infarction, heavy smoker, COPD,

insulin-dependent diabetes mellitus, peripheral artery disease, and chronic

stable angina.



SOCIAL HISTORY:  He used to smoke 2 packs a day for 7 years.  No ethanol

consumption.



FAMILY HISTORY:  Positive for heart problems.  Father  of heart problems at

age 49 and history of stroke.  Father had also had a stroke ____ and diabetes.



PAST SURGICAL HISTORY:  Two cardiac stents, eye surgery, skin grafting,

tonsillectomy post-BiV pacer, ICD implant in the past.



PHYSICAL EXAMINATION:

GENERAL:  He is 187 cm, weight 113 kilograms, BMI 32.1.

VITAL SIGNS:  Showed temperature 98, pulse 75, 152/100, respirations were 20.

HEENT:  Unremarkable.

NECK:  JVD noted about 8-10 cm over the angle of Shailesh.  No definite carotid

bruits.

CHEST:  Thick chest wall.

LUNGS:  Poor air entry bilaterally, rales in the bases.

HEART:  Sounds S1, S2 normal.

ABDOMEN:  Soft.  Bowel sounds present.

EXTREMITIES:  1-2+ leg edema.

NEUROLOGIC:  No focal neuro deficit is documented.



LABORATORY DATA:  Initially has shown hyperkalemia, but potassium is down to

3.9.  ProBNP 647, 1.66 creatinine, BUN is 14.



IMAGING STUDIES:  Chest x-ray done on 18 ____ cardiomegaly.



IMPRESSION:  Chronic systolic heart failure, very poor ejection fraction, about

10-15% with abnormal cardiac catheterization showing left main patent, LAD

stents are patent, circumflex is patent, right coronary artery ____ coronary

ostium.  LVEDP was 10-15 mm with an ejection fraction of 15-20%.



PLAN:  At this time admit the patient, hold his lisinopril in the hospital

because of low blood pressure and initiate Entresto from 2018, IV Lasix

and telemetry monitoring ____.





______________________________

Laxmichand MD [facility]



DR:  POOJA/luis  JOB# [id number]  [id number]

DD:  2018 22:18  DT:  2018 23:00

## 2018-12-12 NOTE — NUR
Discharge



Patient discharged home after DC teaching provided. Verbal and paper copies provided. IV and 
Telemetry DCd. Patient refused to have a conversation about the possibility of oxygen at 
night. States, "I would rather just pass away, then have to worry about carrying a tank 
around!" 'i sleep just fine, and been like this for a long time and I have had no problems." 
Teaching provided by this RN concerning sleep apnea, and the strain on the heart. Patient 
refused to accept a wheelchair ride to the exit. Ambulated to his vehicle, with his 
belongings.

## 2018-12-13 NOTE — DSH
DATE OF DISCHARGE:  12/12/2018



FINAL DIAGNOSES:  Dilated ischemic cardiomyopathy with acute on chronic systolic

heart failure, ejection fraction of 15%, post-biventricular pacer implantable

cardioverter defibrillator,  initiated on Entresto medication, coronary artery

disease, patent left anterior descending stent, marked cardiomegaly.



Please refer to my history and physical to the point of my impression.



HOSPITAL COURSE:  The patient is a 63-year-old white male who was referred from

from the [facility], has been on poly-therapy for dilated cardiomyopathy,

ischemic in nature.  He has got prior coronary stenting, probably had an old

extensive anterior wall infarct and he had abnormal myocardial perfusion scan

with a fixed defect in the anterolateral and apical segment and lateral ischemia

was also noted.  Difficult to assess the extent of ischemia with a large scar

already present and he was admitted for cardiac catheterization. 

Post-catheterization noted to have patent LAD stent, but his ejection fraction

was only 10-15% and maximum would be 20% with marked LV dilatation and large

anteroapical lateral segment dyskinetic segment, akinesis -- dyskinesis with

anterobasal, posterobasal mild hypo-contractility and he had elevated LVEDP of

close to 20 mm and he had not taken an ACE inhibitor.  It was a perfect

opportunity to put him in the hospital and give IV diuretics and initiate him on

Entresto and see if he is able to tolerate and assess his blood pressure after

the initiation of Entresto.  He did well and his blood pressure was stable and

he was discharged on 12/12/2018 on aspirin 81 mg once a day, Entresto 24/26 one

tablet twice a day, torsemide 20 mg once a day, history of paroxysmal atrial

fibrillation, started on Eliquis 5 mg twice a day, Lipitor 20 mg once a day,

Coreg 12.5 mg twice a day, insulin-dependent diabetic with Lantus insulin 50

units at bedtime, potassium 20 mEq once a day.  I did stop his lisinopril and

his Lasix and he was to come back on December 19th at 2:45 p.m. for assessment

of his blood pressure and we will send prescription of Entresto and torsemide at

the [facility], probably requires aggressive statin therapy and 80 mg of

Lipitor, but [facility].





______________________________

Laxmichand MD [facility]



DR:  POOJA/luis  JOB# [id number]  [id number]

DD:  12/13/2018 09:46  DT:  12/13/2018 12:40

## 2018-12-19 ENCOUNTER — PES CALL (OUTPATIENT)
Dept: ADMINISTRATIVE | Facility: CLINIC | Age: 63
End: 2018-12-19